# Patient Record
Sex: FEMALE | Race: BLACK OR AFRICAN AMERICAN | NOT HISPANIC OR LATINO | Employment: FULL TIME | URBAN - METROPOLITAN AREA
[De-identification: names, ages, dates, MRNs, and addresses within clinical notes are randomized per-mention and may not be internally consistent; named-entity substitution may affect disease eponyms.]

---

## 2017-04-12 ENCOUNTER — HISTORICAL (OUTPATIENT)
Dept: ADMINISTRATIVE | Facility: HOSPITAL | Age: 57
End: 2017-04-12

## 2017-09-15 LAB — CRC RECOMMENDATION EXT: NORMAL

## 2017-11-16 ENCOUNTER — HISTORICAL (OUTPATIENT)
Dept: ADMINISTRATIVE | Facility: HOSPITAL | Age: 57
End: 2017-11-16

## 2017-11-16 LAB
ABS NEUT (OLG): 1.05 X10(3)/MCL (ref 2.1–9.2)
ALBUMIN SERPL-MCNC: 3.9 GM/DL (ref 3.4–5)
ALBUMIN/GLOB SERPL: 1.1 RATIO (ref 1.1–2)
ALP SERPL-CCNC: 82 UNIT/L (ref 38–126)
ALT SERPL-CCNC: 35 UNIT/L (ref 12–78)
APPEARANCE, UA: CLEAR
AST SERPL-CCNC: 20 UNIT/L (ref 15–37)
BACTERIA SPEC CULT: NORMAL /HPF
BASOPHILS NFR BLD MANUAL: 1 % (ref 0–2)
BILIRUB SERPL-MCNC: 0.5 MG/DL (ref 0.2–1)
BILIRUB UR QL STRIP: NEGATIVE
BILIRUBIN DIRECT+TOT PNL SERPL-MCNC: 0.1 MG/DL (ref 0–0.5)
BILIRUBIN DIRECT+TOT PNL SERPL-MCNC: 0.4 MG/DL (ref 0–0.8)
BUN SERPL-MCNC: 13 MG/DL (ref 7–18)
CALCIUM SERPL-MCNC: 9.8 MG/DL (ref 8.5–10.1)
CHLORIDE SERPL-SCNC: 104 MMOL/L (ref 98–107)
CHOLEST SERPL-MCNC: 163 MG/DL (ref 0–200)
CHOLEST/HDLC SERPL: 2.6 {RATIO} (ref 0–4)
CO2 SERPL-SCNC: 29 MMOL/L (ref 21–32)
COLOR UR: YELLOW
CREAT SERPL-MCNC: 0.83 MG/DL (ref 0.55–1.02)
EOSINOPHIL NFR BLD MANUAL: 4 % (ref 0–8)
ERYTHROCYTE [DISTWIDTH] IN BLOOD BY AUTOMATED COUNT: 12.4 % (ref 11.5–17)
GLOBULIN SER-MCNC: 3.7 GM/DL (ref 2.4–3.5)
GLUCOSE (UA): NEGATIVE
GLUCOSE SERPL-MCNC: 98 MG/DL (ref 74–106)
HCT VFR BLD AUTO: 38.4 % (ref 37–47)
HDLC SERPL-MCNC: 63 MG/DL (ref 35–60)
HGB BLD-MCNC: 13 GM/DL (ref 12–16)
HGB UR QL STRIP: NEGATIVE
KETONES UR QL STRIP: NEGATIVE
LDLC SERPL CALC-MCNC: 85 MG/DL (ref 0–129)
LEUKOCYTE ESTERASE UR QL STRIP: NEGATIVE
LYMPHOCYTES NFR BLD MANUAL: 48 % (ref 13–40)
LYMPHOCYTES NFR BLD MANUAL: 7 %
MCH RBC QN AUTO: 30 PG (ref 27–31)
MCHC RBC AUTO-ENTMCNC: 33.9 GM/DL (ref 33–36)
MCV RBC AUTO: 88.7 FL (ref 80–94)
MONOCYTES NFR BLD MANUAL: 2 % (ref 2–11)
NEUTROPHILS NFR BLD MANUAL: 37 % (ref 47–80)
NITRITE UR QL STRIP: NEGATIVE
PH UR STRIP: 5.5 [PH] (ref 5–9)
PLATELET # BLD AUTO: 222 X10(3)/MCL (ref 130–400)
PLATELET # BLD EST: NORMAL 10*3/UL
PMV BLD AUTO: 10 FL (ref 7.4–10.4)
POIKILOCYTOSIS BLD QL SMEAR: 1
POTASSIUM SERPL-SCNC: 4.2 MMOL/L (ref 3.5–5.1)
PROT SERPL-MCNC: 7.6 GM/DL (ref 6.4–8.2)
PROT UR QL STRIP: NEGATIVE
RBC # BLD AUTO: 4.33 X10(6)/MCL (ref 4.2–5.4)
RBC #/AREA URNS HPF: NORMAL /[HPF]
SODIUM SERPL-SCNC: 143 MMOL/L (ref 136–145)
SP GR UR STRIP: 1.02 (ref 1–1.03)
SQUAMOUS EPITHELIAL, UA: NORMAL
TRIGL SERPL-MCNC: 76 MG/DL (ref 30–150)
TSH SERPL-ACNC: 3.24 MIU/ML (ref 0.36–3.74)
UROBILINOGEN UR STRIP-ACNC: 0.2
VLDLC SERPL CALC-MCNC: 15 MG/DL
WBC # SPEC AUTO: 3.4 X10(3)/MCL (ref 4.5–11.5)
WBC #/AREA URNS HPF: NORMAL /[HPF]

## 2018-02-23 LAB — PAP RECOMMENDATION EXT: NORMAL

## 2018-03-20 ENCOUNTER — HISTORICAL (OUTPATIENT)
Dept: RADIOLOGY | Facility: HOSPITAL | Age: 58
End: 2018-03-20

## 2018-04-05 ENCOUNTER — HISTORICAL (OUTPATIENT)
Dept: ADMINISTRATIVE | Facility: HOSPITAL | Age: 58
End: 2018-04-05

## 2018-04-05 LAB
ABS NEUT (OLG): 1.41 X10(3)/MCL (ref 2.1–9.2)
ALBUMIN SERPL-MCNC: 3.6 GM/DL (ref 3.4–5)
ALBUMIN/GLOB SERPL: 1 RATIO (ref 1.1–2)
ALP SERPL-CCNC: 83 UNIT/L (ref 38–126)
ALT SERPL-CCNC: 54 UNIT/L (ref 12–78)
ANISOCYTOSIS BLD QL SMEAR: 1
APPEARANCE, UA: ABNORMAL
APTT PPP: 28.6 SECOND(S) (ref 24.8–36.9)
AST SERPL-CCNC: 59 UNIT/L (ref 15–37)
BACTERIA SPEC CULT: ABNORMAL /HPF
BILIRUB SERPL-MCNC: 0.6 MG/DL (ref 0.2–1)
BILIRUB UR QL STRIP: NEGATIVE
BILIRUBIN DIRECT+TOT PNL SERPL-MCNC: 0.1 MG/DL (ref 0–0.5)
BILIRUBIN DIRECT+TOT PNL SERPL-MCNC: 0.5 MG/DL (ref 0–0.8)
BUN SERPL-MCNC: 19 MG/DL (ref 7–18)
CALCIUM SERPL-MCNC: 9.6 MG/DL (ref 8.5–10.1)
CHLORIDE SERPL-SCNC: 103 MMOL/L (ref 98–107)
CO2 SERPL-SCNC: 26 MMOL/L (ref 21–32)
COLOR UR: YELLOW
CREAT SERPL-MCNC: 0.77 MG/DL (ref 0.55–1.02)
EOSINOPHIL NFR BLD MANUAL: 2 % (ref 0–8)
ERYTHROCYTE [DISTWIDTH] IN BLOOD BY AUTOMATED COUNT: 12.5 % (ref 11.5–17)
GLOBULIN SER-MCNC: 3.6 GM/DL (ref 2.4–3.5)
GLUCOSE (UA): NEGATIVE
GLUCOSE SERPL-MCNC: 97 MG/DL (ref 74–106)
HCT VFR BLD AUTO: 38.1 % (ref 37–47)
HGB BLD-MCNC: 12.7 GM/DL (ref 12–16)
HGB UR QL STRIP: NEGATIVE
INR PPP: 1.04 (ref 0–1.27)
KETONES UR QL STRIP: NEGATIVE
LEUKOCYTE ESTERASE UR QL STRIP: ABNORMAL
LYMPHOCYTES NFR BLD MANUAL: 52 % (ref 13–40)
MCH RBC QN AUTO: 29.3 PG (ref 27–31)
MCHC RBC AUTO-ENTMCNC: 33.3 GM/DL (ref 33–36)
MCV RBC AUTO: 87.8 FL (ref 80–94)
MONOCYTES NFR BLD MANUAL: 11 % (ref 2–11)
NEUTROPHILS NFR BLD MANUAL: 35 % (ref 47–80)
NITRITE UR QL STRIP: NEGATIVE
OVALOCYTES BLD QL SMEAR: 1
PH UR STRIP: 6.5 [PH] (ref 5–9)
PLATELET # BLD AUTO: 216 X10(3)/MCL (ref 130–400)
PLATELET # BLD EST: NORMAL 10*3/UL
PMV BLD AUTO: 10.5 FL (ref 7.4–10.4)
POIKILOCYTOSIS BLD QL SMEAR: 1
POTASSIUM SERPL-SCNC: 4.4 MMOL/L (ref 3.5–5.1)
PROT SERPL-MCNC: 7.2 GM/DL (ref 6.4–8.2)
PROT UR QL STRIP: NEGATIVE
PROTHROMBIN TIME: 13.9 SECOND(S) (ref 12.2–14.7)
RBC # BLD AUTO: 4.34 X10(6)/MCL (ref 4.2–5.4)
RBC #/AREA URNS HPF: ABNORMAL /HPF
SODIUM SERPL-SCNC: 138 MMOL/L (ref 136–145)
SP GR UR STRIP: 1.03 (ref 1–1.03)
SQUAMOUS EPITHELIAL, UA: ABNORMAL
UA WBC MAN: ABNORMAL /HPF
UROBILINOGEN UR STRIP-ACNC: 1
WBC # SPEC AUTO: 3.8 X10(3)/MCL (ref 4.5–11.5)

## 2018-10-26 ENCOUNTER — HISTORICAL (OUTPATIENT)
Dept: ADMINISTRATIVE | Facility: HOSPITAL | Age: 58
End: 2018-10-26

## 2018-10-26 LAB
ABS NEUT (OLG): 0.83 X10(3)/MCL (ref 2.1–9.2)
ALBUMIN SERPL-MCNC: 3.9 GM/DL (ref 3.4–5)
ALBUMIN/GLOB SERPL: 1.3 RATIO (ref 1.1–2)
ALP SERPL-CCNC: 85 UNIT/L (ref 38–126)
ALT SERPL-CCNC: 27 UNIT/L (ref 12–78)
ANISOCYTOSIS BLD QL SMEAR: 1
APPEARANCE, UA: CLEAR
AST SERPL-CCNC: 12 UNIT/L (ref 15–37)
BACTERIA SPEC CULT: NORMAL /HPF
BASOPHILS NFR BLD MANUAL: 1 % (ref 0–2)
BILIRUB SERPL-MCNC: 0.3 MG/DL (ref 0.2–1)
BILIRUB UR QL STRIP: NEGATIVE
BILIRUBIN DIRECT+TOT PNL SERPL-MCNC: 0.1 MG/DL (ref 0–0.5)
BILIRUBIN DIRECT+TOT PNL SERPL-MCNC: 0.2 MG/DL (ref 0–0.8)
BUN SERPL-MCNC: 18 MG/DL (ref 7–18)
CALCIUM SERPL-MCNC: 8.9 MG/DL (ref 8.5–10.1)
CHLORIDE SERPL-SCNC: 110 MMOL/L (ref 98–107)
CHOLEST SERPL-MCNC: 181 MG/DL (ref 0–200)
CHOLEST/HDLC SERPL: 3.2 {RATIO} (ref 0–4)
CO2 SERPL-SCNC: 29 MMOL/L (ref 21–32)
COLOR UR: YELLOW
CREAT SERPL-MCNC: 0.69 MG/DL (ref 0.55–1.02)
EOSINOPHIL NFR BLD MANUAL: 2 % (ref 0–8)
ERYTHROCYTE [DISTWIDTH] IN BLOOD BY AUTOMATED COUNT: 12.7 % (ref 11.5–17)
GLOBULIN SER-MCNC: 3.1 GM/DL (ref 2.4–3.5)
GLUCOSE (UA): NEGATIVE
GLUCOSE SERPL-MCNC: 89 MG/DL (ref 74–106)
HCT VFR BLD AUTO: 38.7 % (ref 37–47)
HDLC SERPL-MCNC: 57 MG/DL (ref 35–60)
HGB BLD-MCNC: 12.2 GM/DL (ref 12–16)
HGB UR QL STRIP: NEGATIVE
KETONES UR QL STRIP: NEGATIVE
LDLC SERPL CALC-MCNC: 109 MG/DL (ref 0–129)
LEUKOCYTE ESTERASE UR QL STRIP: NEGATIVE
LYMPHOCYTES NFR BLD MANUAL: 60 % (ref 13–40)
MCH RBC QN AUTO: 29.2 PG (ref 27–31)
MCHC RBC AUTO-ENTMCNC: 31.5 GM/DL (ref 33–36)
MCV RBC AUTO: 92.6 FL (ref 80–94)
MICROCYTES BLD QL SMEAR: 1
MONOCYTES NFR BLD MANUAL: 5 % (ref 2–11)
NEUTROPHILS NFR BLD MANUAL: 32 % (ref 47–80)
NITRITE UR QL STRIP: NEGATIVE
PH UR STRIP: 5.5 [PH] (ref 5–9)
PLATELET # BLD AUTO: 204 X10(3)/MCL (ref 130–400)
PLATELET # BLD EST: NORMAL 10*3/UL
PMV BLD AUTO: 11 FL (ref 7.4–10.4)
POTASSIUM SERPL-SCNC: 4.4 MMOL/L (ref 3.5–5.1)
PROT SERPL-MCNC: 7 GM/DL (ref 6.4–8.2)
PROT UR QL STRIP: NEGATIVE
RBC # BLD AUTO: 4.18 X10(6)/MCL (ref 4.2–5.4)
RBC #/AREA URNS HPF: NORMAL /[HPF]
SODIUM SERPL-SCNC: 144 MMOL/L (ref 136–145)
SP GR UR STRIP: 1.02 (ref 1–1.03)
SQUAMOUS EPITHELIAL, UA: NORMAL
TRIGL SERPL-MCNC: 75 MG/DL (ref 30–150)
TSH SERPL-ACNC: 0.81 MIU/L (ref 0.36–3.74)
UROBILINOGEN UR STRIP-ACNC: 0.2
VLDLC SERPL CALC-MCNC: 15 MG/DL
WBC # SPEC AUTO: 2.8 X10(3)/MCL (ref 4.5–11.5)
WBC #/AREA URNS HPF: NORMAL /[HPF]

## 2019-05-13 ENCOUNTER — HISTORICAL (OUTPATIENT)
Dept: ADMINISTRATIVE | Facility: HOSPITAL | Age: 59
End: 2019-05-13

## 2019-05-13 LAB
ABS NEUT (OLG): 0.9 X10(3)/MCL (ref 2.1–9.2)
ALBUMIN SERPL-MCNC: 3.7 GM/DL (ref 3.4–5)
ALBUMIN/GLOB SERPL: 1.2 RATIO (ref 1.1–2)
ALP SERPL-CCNC: 77 UNIT/L (ref 38–126)
ALT SERPL-CCNC: 27 UNIT/L (ref 12–78)
APPEARANCE, UA: CLEAR
AST SERPL-CCNC: 14 UNIT/L (ref 15–37)
BACTERIA SPEC CULT: NORMAL /HPF
BASOPHILS # BLD AUTO: 0 X10(3)/MCL (ref 0–0.2)
BASOPHILS NFR BLD AUTO: 0 %
BILIRUB SERPL-MCNC: 0.3 MG/DL (ref 0.2–1)
BILIRUB UR QL STRIP: NEGATIVE
BILIRUBIN DIRECT+TOT PNL SERPL-MCNC: 0.1 MG/DL (ref 0–0.5)
BILIRUBIN DIRECT+TOT PNL SERPL-MCNC: 0.2 MG/DL (ref 0–0.8)
BUN SERPL-MCNC: 11 MG/DL (ref 7–18)
CALCIUM SERPL-MCNC: 9.3 MG/DL (ref 8.5–10.1)
CHLORIDE SERPL-SCNC: 107 MMOL/L (ref 98–107)
CHOLEST SERPL-MCNC: 192 MG/DL (ref 0–200)
CHOLEST/HDLC SERPL: 3.2 {RATIO} (ref 0–4)
CO2 SERPL-SCNC: 32 MMOL/L (ref 21–32)
COLOR UR: YELLOW
CREAT SERPL-MCNC: 0.68 MG/DL (ref 0.55–1.02)
CREAT UR-MCNC: 29 MG/DL
DEPRECATED CALCIDIOL+CALCIFEROL SERPL-MC: 9.7 NG/ML (ref 30–80)
EOSINOPHIL # BLD AUTO: 0.1 X10(3)/MCL (ref 0–0.9)
EOSINOPHIL NFR BLD AUTO: 3 %
ERYTHROCYTE [DISTWIDTH] IN BLOOD BY AUTOMATED COUNT: 12.5 % (ref 11.5–17)
EST. AVERAGE GLUCOSE BLD GHB EST-MCNC: 114 MG/DL
GLOBULIN SER-MCNC: 3.2 GM/DL (ref 2.4–3.5)
GLUCOSE (UA): NEGATIVE
GLUCOSE SERPL-MCNC: 79 MG/DL (ref 74–106)
HBA1C MFR BLD: 5.6 % (ref 4.2–6.3)
HCT VFR BLD AUTO: 36.2 % (ref 37–47)
HDLC SERPL-MCNC: 60 MG/DL (ref 35–60)
HGB BLD-MCNC: 11.6 GM/DL (ref 12–16)
HGB UR QL STRIP: NEGATIVE
KETONES UR QL STRIP: NEGATIVE
LDLC SERPL CALC-MCNC: 114 MG/DL (ref 0–129)
LEUKOCYTE ESTERASE UR QL STRIP: NEGATIVE
LYMPHOCYTES # BLD AUTO: 2 X10(3)/MCL (ref 0.6–4.6)
LYMPHOCYTES NFR BLD AUTO: 62 %
MCH RBC QN AUTO: 28.9 PG (ref 27–31)
MCHC RBC AUTO-ENTMCNC: 32 GM/DL (ref 33–36)
MCV RBC AUTO: 90.3 FL (ref 80–94)
MICROALBUMIN UR-MCNC: <0.5 MG/DL
MICROALBUMIN/CREAT RATIO PNL UR: <17.2 MG/GM CR (ref 0–30)
MONOCYTES # BLD AUTO: 0.2 X10(3)/MCL (ref 0.1–1.3)
MONOCYTES NFR BLD AUTO: 6 %
NEUTROPHILS # BLD AUTO: 0.9 X10(3)/MCL (ref 2.1–9.2)
NEUTROPHILS NFR BLD AUTO: 29 %
NITRITE UR QL STRIP: NEGATIVE
PH UR STRIP: 7 [PH] (ref 5–9)
PLATELET # BLD AUTO: 217 X10(3)/MCL (ref 130–400)
PMV BLD AUTO: 10.5 FL (ref 9.4–12.4)
POTASSIUM SERPL-SCNC: 3.9 MMOL/L (ref 3.5–5.1)
PROT SERPL-MCNC: 6.9 GM/DL (ref 6.4–8.2)
PROT UR QL STRIP: NEGATIVE
RBC # BLD AUTO: 4.01 X10(6)/MCL (ref 4.2–5.4)
RBC #/AREA URNS HPF: NORMAL /[HPF]
SODIUM SERPL-SCNC: 141 MMOL/L (ref 136–145)
SP GR UR STRIP: 1.01 (ref 1–1.03)
SQUAMOUS EPITHELIAL, UA: NORMAL
TRIGL SERPL-MCNC: 91 MG/DL (ref 30–150)
TSH SERPL-ACNC: 0.4 MIU/L (ref 0.36–3.74)
UROBILINOGEN UR STRIP-ACNC: 0.2
VLDLC SERPL CALC-MCNC: 18 MG/DL
WBC # SPEC AUTO: 3.1 X10(3)/MCL (ref 4.5–11.5)
WBC #/AREA URNS HPF: NORMAL /[HPF]

## 2019-12-04 ENCOUNTER — HISTORICAL (OUTPATIENT)
Dept: ADMINISTRATIVE | Facility: HOSPITAL | Age: 59
End: 2019-12-04

## 2019-12-04 LAB
ABS NEUT (OLG): 1.07 X10(3)/MCL (ref 2.1–9.2)
ALBUMIN SERPL-MCNC: 3.9 GM/DL (ref 3.4–5)
ALBUMIN/GLOB SERPL: 1.3 {RATIO}
ALP SERPL-CCNC: 77 UNIT/L (ref 38–126)
ALT SERPL-CCNC: 41 UNIT/L (ref 12–78)
APPEARANCE, UA: CLEAR
AST SERPL-CCNC: 21 UNIT/L (ref 15–37)
BACTERIA SPEC CULT: NORMAL /HPF
BASOPHILS # BLD AUTO: 0 X10(3)/MCL (ref 0–0.2)
BASOPHILS NFR BLD AUTO: 0 %
BILIRUB SERPL-MCNC: 0.7 MG/DL (ref 0.2–1)
BILIRUB UR QL STRIP: NEGATIVE
BILIRUBIN DIRECT+TOT PNL SERPL-MCNC: 0.1 MG/DL (ref 0–0.2)
BILIRUBIN DIRECT+TOT PNL SERPL-MCNC: 0.6 MG/DL (ref 0–0.8)
BUN SERPL-MCNC: 12 MG/DL (ref 7–18)
CALCIUM SERPL-MCNC: 9.4 MG/DL (ref 8.5–10.1)
CHLORIDE SERPL-SCNC: 108 MMOL/L (ref 98–107)
CHOLEST SERPL-MCNC: 196 MG/DL (ref 0–200)
CHOLEST/HDLC SERPL: 3.6 {RATIO} (ref 0–4)
CO2 SERPL-SCNC: 28 MMOL/L (ref 21–32)
COLOR UR: YELLOW
CREAT SERPL-MCNC: 0.85 MG/DL (ref 0.55–1.02)
DEPRECATED CALCIDIOL+CALCIFEROL SERPL-MC: 15.74 NG/ML (ref 30–80)
EOSINOPHIL # BLD AUTO: 0.1 X10(3)/MCL (ref 0–0.9)
EOSINOPHIL NFR BLD AUTO: 3 %
ERYTHROCYTE [DISTWIDTH] IN BLOOD BY AUTOMATED COUNT: 12.7 % (ref 11.5–17)
GLOBULIN SER-MCNC: 3 GM/DL (ref 2.4–3.5)
GLUCOSE (UA): NEGATIVE
GLUCOSE SERPL-MCNC: 101 MG/DL (ref 74–106)
HCT VFR BLD AUTO: 37.6 % (ref 37–47)
HDLC SERPL-MCNC: 54 MG/DL (ref 35–60)
HGB BLD-MCNC: 12.5 GM/DL (ref 12–16)
HGB UR QL STRIP: NEGATIVE
KETONES UR QL STRIP: NEGATIVE
LDLC SERPL CALC-MCNC: 118 MG/DL (ref 0–129)
LEUKOCYTE ESTERASE UR QL STRIP: NEGATIVE
LYMPHOCYTES # BLD AUTO: 1.6 X10(3)/MCL (ref 0.6–4.6)
LYMPHOCYTES NFR BLD AUTO: 53 %
MCH RBC QN AUTO: 30 PG (ref 27–31)
MCHC RBC AUTO-ENTMCNC: 33.2 GM/DL (ref 33–36)
MCV RBC AUTO: 90.2 FL (ref 80–94)
MONOCYTES # BLD AUTO: 0.2 X10(3)/MCL (ref 0.1–1.3)
MONOCYTES NFR BLD AUTO: 7 %
NEUTROPHILS # BLD AUTO: 1.07 X10(3)/MCL (ref 2.1–9.2)
NEUTROPHILS NFR BLD AUTO: 36 %
NITRITE UR QL STRIP: NEGATIVE
PH UR STRIP: 5.5 [PH] (ref 5–9)
PLATELET # BLD AUTO: 209 X10(3)/MCL (ref 130–400)
PMV BLD AUTO: 10.8 FL (ref 9.4–12.4)
POTASSIUM SERPL-SCNC: 4 MMOL/L (ref 3.5–5.1)
PROT SERPL-MCNC: 6.9 GM/DL (ref 6.4–8.2)
PROT UR QL STRIP: NEGATIVE
RBC # BLD AUTO: 4.17 X10(6)/MCL (ref 4.2–5.4)
RBC #/AREA URNS HPF: NORMAL /[HPF]
SODIUM SERPL-SCNC: 140 MMOL/L (ref 136–145)
SP GR UR STRIP: 1.02 (ref 1–1.03)
SQUAMOUS EPITHELIAL, UA: NORMAL
TRIGL SERPL-MCNC: 120 MG/DL (ref 30–150)
TSH SERPL-ACNC: 1.47 MIU/L (ref 0.36–3.74)
UROBILINOGEN UR STRIP-ACNC: 0.2
VLDLC SERPL CALC-MCNC: 24 MG/DL
WBC # SPEC AUTO: 3 X10(3)/MCL (ref 4.5–11.5)
WBC #/AREA URNS HPF: NORMAL /[HPF]

## 2020-06-02 ENCOUNTER — HISTORICAL (OUTPATIENT)
Dept: ADMINISTRATIVE | Facility: HOSPITAL | Age: 60
End: 2020-06-02

## 2020-06-02 LAB
ABS NEUT (OLG): 0.87 X10(3)/MCL (ref 2.1–9.2)
ALBUMIN SERPL-MCNC: 4 GM/DL (ref 3.5–5)
ALBUMIN/GLOB SERPL: 1.5 RATIO (ref 1.1–2)
ALP SERPL-CCNC: 67 UNIT/L (ref 40–150)
ALT SERPL-CCNC: 21 UNIT/L (ref 0–55)
APPEARANCE, UA: CLEAR
AST SERPL-CCNC: 19 UNIT/L (ref 5–34)
BACTERIA SPEC CULT: ABNORMAL /HPF
BILIRUB SERPL-MCNC: 0.5 MG/DL
BILIRUB UR QL STRIP: NEGATIVE
BILIRUBIN DIRECT+TOT PNL SERPL-MCNC: 0.2 MG/DL (ref 0–0.5)
BILIRUBIN DIRECT+TOT PNL SERPL-MCNC: 0.3 MG/DL (ref 0–0.8)
BUN SERPL-MCNC: 13.1 MG/DL (ref 9.8–20.1)
CALCIUM SERPL-MCNC: 9.2 MG/DL (ref 8.4–10.2)
CHLORIDE SERPL-SCNC: 104 MMOL/L (ref 98–107)
CHOLEST SERPL-MCNC: 225 MG/DL
CHOLEST/HDLC SERPL: 5 {RATIO} (ref 0–5)
CO2 SERPL-SCNC: 30 MMOL/L (ref 22–29)
COLOR UR: YELLOW
CREAT SERPL-MCNC: 0.75 MG/DL (ref 0.55–1.02)
CREAT UR-MCNC: 165.3 MG/DL (ref 45–106)
DEPRECATED CALCIDIOL+CALCIFEROL SERPL-MC: 9 NG/ML (ref 6.6–49.9)
EOSINOPHIL NFR BLD MANUAL: 7 % (ref 0–8)
ERYTHROCYTE [DISTWIDTH] IN BLOOD BY AUTOMATED COUNT: 12.5 % (ref 11.5–17)
EST. AVERAGE GLUCOSE BLD GHB EST-MCNC: 116.9 MG/DL
GLOBULIN SER-MCNC: 2.7 GM/DL (ref 2.4–3.5)
GLUCOSE (UA): NEGATIVE
GLUCOSE SERPL-MCNC: 101 MG/DL (ref 74–100)
HBA1C MFR BLD: 5.7 %
HCT VFR BLD AUTO: 37.9 % (ref 37–47)
HDLC SERPL-MCNC: 49 MG/DL (ref 35–60)
HGB BLD-MCNC: 12.1 GM/DL (ref 12–16)
HGB UR QL STRIP: NEGATIVE
KETONES UR QL STRIP: NEGATIVE
LDLC SERPL CALC-MCNC: 159 MG/DL (ref 50–140)
LEUKOCYTE ESTERASE UR QL STRIP: NEGATIVE
LYMPHOCYTES NFR BLD MANUAL: 58 % (ref 13–40)
MCH RBC QN AUTO: 29.2 PG (ref 27–31)
MCHC RBC AUTO-ENTMCNC: 31.9 GM/DL (ref 33–36)
MCV RBC AUTO: 91.5 FL (ref 80–94)
MICROALBUMIN UR-MCNC: 11.4 UG/ML
MICROALBUMIN/CREAT RATIO PNL UR: 6.9 MG/GM CR (ref 0–30)
MONOCYTES NFR BLD MANUAL: 5 % (ref 2–11)
NEUTROPHILS NFR BLD MANUAL: 30 % (ref 47–80)
NITRITE UR QL STRIP: NEGATIVE
PH UR STRIP: 5.5 [PH] (ref 5–9)
PLATELET # BLD AUTO: 206 X10(3)/MCL (ref 130–400)
PLATELET # BLD EST: ADEQUATE 10*3/UL
PMV BLD AUTO: 10.9 FL (ref 7.4–10.4)
POTASSIUM SERPL-SCNC: 4.2 MMOL/L (ref 3.5–5.1)
PROT SERPL-MCNC: 6.7 GM/DL (ref 6.4–8.3)
PROT UR QL STRIP: NEGATIVE
RBC # BLD AUTO: 4.14 X10(6)/MCL (ref 4.2–5.4)
RBC #/AREA URNS HPF: ABNORMAL /[HPF]
RBC MORPH BLD: NORMAL
SODIUM SERPL-SCNC: 141 MMOL/L (ref 136–145)
SP GR UR STRIP: 1.02 (ref 1–1.03)
SQUAMOUS EPITHELIAL, UA: ABNORMAL
TRIGL SERPL-MCNC: 85 MG/DL (ref 37–140)
TSH SERPL-ACNC: 5.01 UIU/ML (ref 0.35–4.94)
URATE SERPL-MCNC: 4.2 MG/DL (ref 2.7–6)
UROBILINOGEN UR STRIP-ACNC: 0.2
VLDLC SERPL CALC-MCNC: 17 MG/DL
WBC # SPEC AUTO: 2.9 X10(3)/MCL (ref 4.5–11.5)
WBC #/AREA URNS HPF: 5 /HPF (ref 0–3)

## 2021-06-11 ENCOUNTER — HISTORICAL (OUTPATIENT)
Dept: ADMINISTRATIVE | Facility: HOSPITAL | Age: 61
End: 2021-06-11

## 2021-06-11 LAB
ABS NEUT (OLG): 1.04 X10(3)/MCL (ref 2.1–9.2)
ALBUMIN SERPL-MCNC: 3.8 GM/DL (ref 3.5–5)
ALBUMIN/GLOB SERPL: 1.3 RATIO (ref 1.1–2)
ALP SERPL-CCNC: 68 UNIT/L (ref 40–150)
ALT SERPL-CCNC: 26 UNIT/L (ref 0–55)
APPEARANCE, UA: CLEAR
AST SERPL-CCNC: 21 UNIT/L (ref 5–34)
BACTERIA SPEC CULT: NORMAL /HPF
BILIRUB SERPL-MCNC: 0.5 MG/DL
BILIRUB UR QL STRIP: NEGATIVE
BILIRUBIN DIRECT+TOT PNL SERPL-MCNC: 0.2 MG/DL (ref 0–0.5)
BILIRUBIN DIRECT+TOT PNL SERPL-MCNC: 0.3 MG/DL (ref 0–0.8)
BUN SERPL-MCNC: 14.9 MG/DL (ref 9.8–20.1)
CALCIUM SERPL-MCNC: 9.5 MG/DL (ref 8.4–10.2)
CHLORIDE SERPL-SCNC: 103 MMOL/L (ref 98–107)
CHOLEST SERPL-MCNC: 200 MG/DL
CHOLEST/HDLC SERPL: 4 {RATIO} (ref 0–5)
CO2 SERPL-SCNC: 32 MMOL/L (ref 22–29)
COLOR UR: YELLOW
CREAT SERPL-MCNC: 0.83 MG/DL (ref 0.55–1.02)
CREAT UR-MCNC: 63.9 MG/DL (ref 45–106)
DEPRECATED CALCIDIOL+CALCIFEROL SERPL-MC: 17.4 NG/ML (ref 30–80)
EOSINOPHIL NFR BLD MANUAL: 6 % (ref 0–8)
ERYTHROCYTE [DISTWIDTH] IN BLOOD BY AUTOMATED COUNT: 12.6 % (ref 11.5–17)
EST. AVERAGE GLUCOSE BLD GHB EST-MCNC: 114 MG/DL
GLOBULIN SER-MCNC: 2.9 GM/DL (ref 2.4–3.5)
GLUCOSE (UA): NEGATIVE
GLUCOSE SERPL-MCNC: 88 MG/DL (ref 74–100)
HBA1C MFR BLD: 5.6 %
HCT VFR BLD AUTO: 37.6 % (ref 37–47)
HDLC SERPL-MCNC: 50 MG/DL (ref 35–60)
HGB BLD-MCNC: 12.3 GM/DL (ref 12–16)
HGB UR QL STRIP: NEGATIVE
KETONES UR QL STRIP: NEGATIVE
LDLC SERPL CALC-MCNC: 132 MG/DL (ref 50–140)
LEUKOCYTE ESTERASE UR QL STRIP: NEGATIVE
LYMPHOCYTES NFR BLD MANUAL: 45 % (ref 13–40)
MCH RBC QN AUTO: 29.4 PG (ref 27–31)
MCHC RBC AUTO-ENTMCNC: 32.7 GM/DL (ref 33–36)
MCV RBC AUTO: 90 FL (ref 80–94)
MICROALBUMIN UR-MCNC: <5 UG/ML
MICROALBUMIN/CREAT RATIO PNL UR: <7.8 MG/GM CR (ref 0–30)
MONOCYTES NFR BLD MANUAL: 6 % (ref 2–11)
NEUTROPHILS NFR BLD MANUAL: 42 % (ref 47–80)
NITRITE UR QL STRIP: NEGATIVE
PH UR STRIP: 7 [PH] (ref 5–9)
PLATELET # BLD AUTO: 218 X10(3)/MCL (ref 130–400)
PLATELET # BLD EST: NORMAL 10*3/UL
PMV BLD AUTO: 11.1 FL (ref 7.4–10.4)
POIKILOCYTOSIS BLD QL SMEAR: 1
POTASSIUM SERPL-SCNC: 4.1 MMOL/L (ref 3.5–5.1)
PROT SERPL-MCNC: 6.7 GM/DL (ref 6.4–8.3)
PROT UR QL STRIP: NEGATIVE
RBC # BLD AUTO: 4.18 X10(6)/MCL (ref 4.2–5.4)
RBC #/AREA URNS HPF: NORMAL /[HPF]
RBC MORPH BLD: ABNORMAL
SODIUM SERPL-SCNC: 141 MMOL/L (ref 136–145)
SP GR UR STRIP: 1.01 (ref 1–1.03)
SQUAMOUS EPITHELIAL, UA: NORMAL /HPF (ref 0–4)
TRIGL SERPL-MCNC: 89 MG/DL (ref 37–140)
TSH SERPL-ACNC: 2.07 UIU/ML (ref 0.35–4.94)
URATE SERPL-MCNC: 5.4 MG/DL (ref 2.6–6)
UROBILINOGEN UR STRIP-ACNC: 0.2
VLDLC SERPL CALC-MCNC: 18 MG/DL
WBC # SPEC AUTO: 2.9 X10(3)/MCL (ref 4.5–11.5)
WBC #/AREA URNS HPF: NORMAL /[HPF]

## 2021-09-22 ENCOUNTER — HISTORICAL (OUTPATIENT)
Dept: ADMINISTRATIVE | Facility: HOSPITAL | Age: 61
End: 2021-09-22

## 2022-01-06 ENCOUNTER — HISTORICAL (OUTPATIENT)
Dept: ANESTHESIOLOGY | Facility: HOSPITAL | Age: 62
End: 2022-01-06

## 2022-04-10 ENCOUNTER — HISTORICAL (OUTPATIENT)
Dept: ADMINISTRATIVE | Facility: HOSPITAL | Age: 62
End: 2022-04-10
Payer: COMMERCIAL

## 2022-04-24 VITALS
DIASTOLIC BLOOD PRESSURE: 80 MMHG | SYSTOLIC BLOOD PRESSURE: 120 MMHG | BODY MASS INDEX: 33.33 KG/M2 | HEIGHT: 60 IN | OXYGEN SATURATION: 98 % | WEIGHT: 169.75 LBS

## 2022-04-30 NOTE — PROGRESS NOTES
Patient:   Wanda Heredia            MRN: [id number]            FIN: [id number]               Age:   57 years     Sex:  Female     :  1960   Associated Diagnoses:   None   Author:   Sergio NAVARRETE, Gilles MARTINES      Report Summary   Course:  Unchanged. Summary:  This patient has done very well since her last visit, and she had laboratory studies drawn prior to her office visit. I was able to review her laboratory studies with her in detail, and I do not find any acute abnormalities, and importantly, this patient has had an improvement in some of her metabolic studies. There is no evidence of any toxicity to medication that she takes.    This patient will continue her medication as previously given. I encourage her to remain active. Follow-up will be provided.. Plan:  I discussed blood pressure management strategies with the patient today. I also recommend a low salt and low pork diet. We discussed antihypertensive medication. I have stressed an increase in physical activity and exercise .    I held a discussion about cholesterol management with the patient today. The patient understands better than before and will try to change the medication regimen and diet.This is considered a major risk factor for Atherosclerosis.    Exercise is defined as 30 minutes of sustained activity performed at least 3 or 4 days each week.    Remember to take thyroid supplements daily on an empty stomach.. Patient Instructions:  Lipid Profile Test. Orders   Chief Complaint   2017 9:12 CST      6 month check up        History of Present Illness   This patient is a 57 year old established patient. Her active problem list and current medication will be reviewed at the time of this visit. Her medical illnesses have been well recognized and documented in her chart. She had laboratory studies drawn prior to her office visit and she is calm in for a face-to-face encounter to review the results. This is a 6 month follow-up  examination for this patient.      Review of Systems   Constitutional:  Negative.    Eye:  Negative.    Ear/Nose/Mouth/Throat:  Negative.    Respiratory:  Negative.    Cardiovascular:  Negative.    Gastrointestinal:  Negative.    Genitourinary:  Negative.    Gynecologic:  Negative.    Hematology/Lymphatics:  Negative.    Endocrine:  Negative.    Immunologic:  Negative.    Musculoskeletal:  Negative.    Integumentary:  Negative.    Neurologic:  Negative.    Psychiatric:  Negative.    All other systems are negative      Health Status   Allergies:    Allergic Reactions (Selected)  Severity Not Documented  Sulfa drugs- No reactions were documented.,    Allergies (1) Active Reaction  sulfa drugs None Documented     Current medications:  (Selected)   Prescriptions  Prescribed  Nexium 40 mg oral delayed release cap ([facility]): See Instructions, TAKE ONE TABLET BY MOUTH TWICE DAILY, # 60 tab(s), 11 Refill(s), eRx: [id number]  levothyroxine 88 mcg (0.088 mg) oral tablet: 88 mcg = 1 tab(s), Oral, Daily, # 90 tab(s), 4 Refill(s), Pharmacy: [id number]  lisinopril 10 mg oral tablet: 10 mg = 1 tab(s), Oral, Daily, # 90 tab(s), 4 Refill(s), [facility]acy: [id number]  lovastatin 40 mg oral tablet: See Instructions, TAKE ONE TABLET BY MOUTH ONCE DAILY., # 90 tab(s), 4 Refill(s), Pharmacy: [facility] [id number],    Home Medications (4) Active  levothyroxine 88 mcg (0.088 mg) oral tablet 88 mcg = 1 tab(s), Oral, Daily  lisinopril 10 mg oral tablet 10 mg = 1 tab(s), Oral, Daily  lovastatin 40 mg oral tablet See Instructions  Nexium 40 mg oral delayed release cap ([facility]stitution) See Instructions     Problem list:    All Problems  DDD (Degenerative Disc Disease)(  Confirmed  ) / SNOMED CT 284476906 / Confirmed  Essential (primary) hypertension / SNOMED CT 58561300 / Confirmed  Fibrocystic breast disease(  Confirmed  ) / SNOMED CT 63100499 / Confirmed  Hyperlipidemia / SNOMED CT  27877730 / Confirmed  IBS (Irritable Bowel Syndrome)(  Confirmed  ) / SNOMED CT 68804626 / Confirmed  Multinodular Goiter(  Confirmed  ) / SNOMED CT 71712080 / Confirmed,    Active Problems (6)  DDD (Degenerative Disc Disease)(  Confirmed  )   Essential (primary) hypertension   Fibrocystic breast disease(  Confirmed  )   Hyperlipidemia   IBS (Irritable Bowel Syndrome)(  Confirmed  )   Multinodular Goiter(  Confirmed  )         Histories   Past Medical History:    No active or resolved past medical history items have been selected or recorded.   Family History:    Hypertension.  Father  Mother     Procedure history:    Hysterectomy (054899098).   delivery (7773707907).  sinus surgery.  Cholecystectomy (56946479).   Social History        Social & Psychosocial Habits    Alcohol  2015  Use: Never    Employment/School  2016  Status: Employed    Exercise  2015 Risk Assessment: Does not exercise    2016  Self assessment: Fair condition    Home/Environment  2015 Risk Assessment: No Risk    2016  Lives with: Spouse    Nutrition/Health  2015 Risk Assessment: No Risk    2016  Type of diet: Regular    Sexual  2016  Sexually active: Yes    Substance Abuse  2015  Use: Never    Tobacco  2015  Use: Never smoker  .        Physical Examination   Vital Signs   2017 9:12 CST      Temperature Temporal Artery               36.7 DegC                             Apical Heart Rate         72 bpm                             SpO2                      98 %                             Systolic Blood Pressure   120 mmHg                             Diastolic Blood Pressure  80 mmHg     General:  Alert and oriented, No acute distress.    Eye:  Pupils are equal, round and reactive to light, Extraocular movements are intact, Normal conjunctiva.    HENT:  Normocephalic, Tympanic membranes are clear, Normal hearing, Oral mucosa is moist, No pharyngeal erythema.     Neck:  Supple, No carotid bruit, No jugular venous distention, No lymphadenopathy, No thyromegaly.    Respiratory:  Lungs are clear to auscultation, Breath sounds are equal, Symmetrical chest wall expansion, No chest wall tenderness.    Cardiovascular:  Normal rate, Regular rhythm, No murmur, No gallop, Good pulses equal in all extremities, No edema.    Gastrointestinal:  Soft, Non-tender, Non-distended, Normal bowel sounds, No organomegaly.    Genitourinary:  No costovertebral angle tenderness, No inguinal tenderness, No urethral discharge, No lesions.    Lymphatics:  No lymphadenopathy neck, axilla, groin.    Musculoskeletal:  Normal range of motion, Normal strength, No tenderness, No swelling, Normal gait.    Integumentary:  Warm, Intact, No rash.    Neurologic:  Alert, Oriented, Normal sensory, Normal motor function, No focal deficits, Cranial Nerves II-XII are grossly intact, Normal deep tendon reflexes.    Psychiatric:  Cooperative, Appropriate mood & affect, Normal judgment, Non-suicidal.       Health Maintenance      Health Maintenance     Pending (in the next year)        OverDue           Smoking Cessation due  04/08/17  and every 1  year(s)           Hypertension Management-Education due  08/19/17  and every 1  year(s)           Hypertension Management-Blood Pressure due  10/13/17  and every 6  month(s)        Due            Influenza Vaccine due  10/01/17  and every 1  year(s)           Aspirin Therapy for CVD Prevention due  11/17/17  and every 1  year(s)        Due In Future            Breast Cancer Screening not due until  11/30/17  and every 1  year(s)           Blood Pressure Screening not due until  04/13/18  and every 1  year(s)           Body Mass Index Check not due until  04/13/18  and every 1  year(s)           Depression Screening not due until  04/13/18  and every 1  year(s)           Obesity Screening not due until  04/13/18  and every 1  year(s)           Hypertension Management-BMP not  due until  11/16/18  and every 1  year(s)     Satisfied (in the past 1 year)        Satisfied            Blood Pressure Screening on  04/13/17.  Satisfied by Kaitlyn Story LPN.           Body Mass Index Check on  04/13/17.  Satisfied by Kaitlyn Story LPN.           Breast Cancer Screening on  04/13/17.  Satisfied by Gilles Hill MD           Colorectal Screening on  09/15/17.  Satisfied by Kaitlyn Story LPN.           Depression Screening on  04/13/17.  Satisfied by Kaitlyn Story LPN.           Diabetes Screening on  11/16/17.  Satisfied by Justin Schrader           Hypertension Management-BMP on  11/16/17.  Satisfied by Justin Schrader           Lipid Screening on  11/16/17.  Satisfied by Felisha Julien           Obesity Screening on  04/13/17.  Satisfied by Kaitlyn Story LPN.        Postponed            Colorectal Screening until  04/13/17.  Recorded for Kaitlyn Story LPN  Reason: Parent Or Guardian Refuses           Influenza Vaccine until  04/13/17.  Recorded for Kaitlyn Story LPN          Impression and Plan   Course:  Unchanged.    Summary:  This patient has done very well since her last visit, and she had laboratory studies drawn prior to her office visit. I was able to review her laboratory studies with her in detail, and I do not find any acute abnormalities, and importantly, this patient has had an improvement in some of her metabolic studies. There is no evidence of any toxicity to medication that she takes.    This patient will continue her medication as previously given. I encourage her to remain active. Follow-up will be provided..    Plan:  I discussed blood pressure management strategies with the patient today. I also recommend a low salt and low pork diet. We discussed antihypertensive medication. I have stressed an increase in physical activity and exercise .    I held a discussion about cholesterol management with the patient today. The patient understands better than before  and will try to change the medication regimen and diet.This is considered a major risk factor for Atherosclerosis.    Exercise is defined as 30 minutes of sustained activity performed at least 3 or 4 days each week.    Remember to take thyroid supplements daily on an empty stomach..    Patient Instructions:  Lipid Profile Test.         Counseled: Patient, Regarding diagnosis, Regarding treatment, Regarding medications, Activity, Verbalized understanding.    Orders   Dx/Order Association Plan   Other Diagnosis   Other Orders      Review / Management   Results review:  Lab results   11/16/2017 7:21 CST      Sodium Lvl                143 mmol/L                             Potassium Lvl             4.2 mmol/L                             Chloride                  104 mmol/L                             CO2                       29.0 mmol/L                             Calcium Lvl               9.8 mg/dL                             Glucose Lvl               98 mg/dL                             BUN                       13.0 mg/dL                             Creatinine                0.83 mg/dL                             eGFR-AA                   >60 mL/min/1.73 m2  NA                             eGFR-WAGNER                  >60 mL/min/1.73 m2  NA                             Bili Total                0.5 mg/dL                             Bili Direct               0.10 mg/dL                             Bili Indirect             0.40 mg/dL                             AST                       20 unit/L                             ALT                       35 unit/L                             Alk Phos                  82 unit/L                             Total Protein             7.6 gm/dL                             Albumin Lvl               3.90 gm/dL                             Globulin                  3.70 gm/dL  HI                             A/G Ratio                 1.1 ratio                             Chol                       163 mg/dL                             HDL                       63 mg/dL  HI                             Trig                      76 mg/dL                             LDL                       85 mg/dL                             Chol/HDL                  2.6                             VLDL                      15 mg/dL  NA                             TSH                       3.240 mIU/mL                             WBC                       3.4 x10(3)/mcL  LOW                             RBC                       4.33 x10(6)/mcL                             Hgb                       13.0 gm/dL                             Hct                       38.4 %                             Platelet                  222 x10(3)/mcL                             MCV                       88.7 fL                             MCH                       30.0 pg                             MCHC                      33.9 gm/dL                             RDW                       12.4 %                             MPV                       10.0 fL                             Abs Neut                  1.05 x10(3)/mcL  LOW                             Neut Man                  37 %  LOW                             Lymph Man                 48 %  HI                             Monocyte Man              2 %                             Eos Man                   4 %                             Basophil Man              1 %                             Abn Lymph Man             7 %  HI                             Platelet Est              Normal                             Poik                      1  NA    11/16/2017 7:16 CST      UA Appear                 CLEAR                             UA Color                  YELLOW                             UA Spec Grav              1.016                             UA Bili                   Negative                             UA pH                     5.5                             UA  Urobilinogen           0.2                             UA Blood                  Negative                             UA Glucose                Negative                             UA Ketones                Negative                             UA Protein                Negative                             UA Nitrite                Negative                             UA Leuk Est               Negative                             UA WBC                    NONE SEEN                             UA RBC                    NONE SEEN                             UA Bacteria               NONE SEEN /HPF                             UA Squam Epithelial       NONE SEEN  .

## 2022-04-30 NOTE — PROGRESS NOTES
Patient:   Wanda Heredia            MRN: [id number]            FIN: [id number]               Age:   59 years     Sex:  Female     :  1960   Associated Diagnoses:   None   Author:   Gilles Hill MD      Report Summary   Course:  This patient has not had any ER visits or hospitalizations since last office visit. SummaryPlan:  This patient should continue to take all medication chronically given for known medical illnesses.    Exercise is defined as 30 minutes of sustained activity performed at least 3 or 4 days each week.. Patient Instructions:  Dyslipidemia. Orders   Chief Complaint   2019 8:10 CDT       well adult exam        History of Present Illness   This patient is an established patient. The active problem list and current medication listed in the chart will be reviewed at the time of this visit. This patient's medical illnesses have been well recognized and documented in the chart. A review of systems will be taken at the time of this visit, and any new information necessary for care will be documented. This patient had laboratory studies drawn prior to her office visit. She has come in for a face-to-face encounter to review the results. This patient has come in for a wellness examination.      Review of Systems   Constitutional:  Negative.    Eye:  Negative.    Ear/Nose/Mouth/Throat:  Negative.    Respiratory:  Negative.    Cardiovascular:  Negative.    Gastrointestinal:  Negative.    Genitourinary:  Negative.    Gynecologic:  Negative.    Hematology/Lymphatics:  Negative.    Endocrine:  Negative.    Immunologic:  Negative.    Musculoskeletal:  Negative.    Integumentary:  This patient complains of a lesion on the back of her neck, which she wanted me to look at..    Neurologic:  Negative.    Psychiatric:  Negative.    All other systems are negative      Health Status   Allergies:    Allergic Reactions (Selected)  Severity Not Documented  Sulfa drugs- No reactions were  documented.,    Allergies (1) Active Reaction  sulfa drugs None Documented   Current medications:  (Selected)   Prescriptions  Prescribed  Cheratussin AC 10 mg-100 mg/5 mL oral syrup: 1-2 tsp, Oral, q4hr, # 120 mL, 1 Refill(s), called to pharmacy (Rx)  Nexium 40 mg oral delayed release cap ([facility] Substitution): See Instructions, TAKE 1 TABLET BY MOUTH TWICE DAILY, # 60 tab(s), 11 Refill(s), eRx: [facility] [id number]  chlordiazepoxide-clidinium 5 mg-2.5 mg oral capsule: See Instructions, TAKE 1 CAPSULE BY MOUTH THREE TIMES DAILY BEFORE A MEAL, # 260 cap(s), 4 Refill(s), eRx: [facility] [id number]  levothyroxine 88 mcg (0.088 mg) oral tablet: 88 mcg = 1 tab(s), Oral, Daily, # 90 tab(s), 4 Refill(s), Pharmacy: [id number]  lisinopril 10 mg oral tablet: See Instructions, TAKE 1 TABLET BY MOUTH DAILY, # 90 tab(s), 4 Refill(s), Pharmacy: [facility] [id number]  lovastatin 40 mg oral tablet: See Instructions, TAKE 1 TABLET BY MOUTH EVERY DAY, # 90 tab(s), 4 Refill(s), eRx: [facility] [id number],    Home Medications (6) Active  Cheratussin AC 10 mg-100 mg/5 mL oral syrup 1-2 tsp, Oral, q4hr  chlordiazepoxide-clidinium 5 mg-2.5 mg oral capsule See Instructions  levothyroxine 88 mcg (0.088 mg) oral tablet 88 mcg = 1 tab(s), Oral, Daily  lisinopril 10 mg oral tablet See Instructions  lovastatin 40 mg oral tablet See Instructions  Nexium 40 mg oral delayed release cap ([facility] Substitution) See Instructions   Problem list:    All Problems  DDD (Degenerative Disc Disease)(  Confirmed  ) / SNOMED CT 494582863 / Confirmed  Essential (primary) hypertension / SNOMED CT 01695268 / Confirmed  Fibrocystic breast disease(  Confirmed  ) / SNOMED CT 82511802 / Confirmed  Hyperlipidemia / SNOMED CT 29125410 / Confirmed  IBS (Irritable Bowel Syndrome)(  Confirmed  ) / SNOMED CT 10286668 / Confirmed  Multinodular Goiter(  Confirmed  ) / SNOMED CT 67024420 / Confirmed,    Active Problems (6)  DDD  (Degenerative Disc Disease)(  Confirmed  )   Essential (primary) hypertension   Fibrocystic breast disease(  Confirmed  )   Hyperlipidemia   IBS (Irritable Bowel Syndrome)(  Confirmed  )   Multinodular Goiter(  Confirmed  )       Histories   Past Medical History:    No active or resolved past medical history items have been selected or recorded.   Family History:    Hypertension.  Mother  Father     Procedure history:    Hysterectomy (687988152).   delivery (5426489403).  sinus surgery.  Cholecystectomy (05328701).   Social History        Social & Psychosocial Habits    Alcohol  2015  Use: Never    Employment/School  2016  Status: Employed    Exercise  2015 Risk Assessment: Does not exercise    2016  Self assessment: Fair condition    Home/Environment  2015 Risk Assessment: No Risk    2016  Lives with: Spouse    Nutrition/Health  2015 Risk Assessment: No Risk    2016  Type of diet: Regular    Sexual  2016  Sexually active: Yes    Substance Abuse  2015  Use: Never    Tobacco  10/30/2018  Use: Never smoker    Patient Wants Consult For Cessation Counseling N/A.        Physical Examination   Vital Signs   2019 8:10 CDT       Temperature Temporal Artery               37.0 DegC                             Apical Heart Rate         68 bpm                             SpO2                      99 %                             Systolic Blood Pressure   120 mmHg                             Diastolic Blood Pressure  80 mmHg                             Blood Pressure Location   Right arm                             Manual Cuff BP            Yes     General:  Alert and oriented, No acute distress.    Eye:  Pupils are equal, round and reactive to light, Extraocular movements are intact, Normal conjunctiva.    HENT:  Normocephalic, Tympanic membranes are clear, Normal hearing, Oral mucosa is moist, No pharyngeal erythema.    Neck:  Supple, No carotid  bruit, No jugular venous distention, No lymphadenopathy, No thyromegaly.    Respiratory:  Lungs are clear to auscultation, Breath sounds are equal, Symmetrical chest wall expansion, No chest wall tenderness.    Cardiovascular:  Normal rate, Regular rhythm, No murmur, No gallop, Good pulses equal in all extremities, No edema.    Gastrointestinal:  Soft, Non-tender, Non-distended, Normal bowel sounds, No organomegaly.    Genitourinary:  No costovertebral angle tenderness, No inguinal tenderness, No urethral discharge, No lesions.    Lymphatics:  No lymphadenopathy neck, axilla, groin.    Musculoskeletal:  Normal range of motion, Normal strength, No tenderness, No swelling, Normal gait.    Integumentary:  Warm, Intact, No rash, Lesion on the back of the neck is a small sebaceous cyst..    Neurologic:  Alert, Oriented, Normal sensory, Normal motor function, No focal deficits, Cranial Nerves II-XII are grossly intact, Normal deep tendon reflexes.    Psychiatric:  Cooperative, Appropriate mood & affect, Normal judgment, Non-suicidal.       Health Maintenance      Health Maintenance     Pending (in the next year)        OverDue           Diabetes Screening due  and every            Hypertension Management-Education due  08/19/17  and every 1  year(s)           Alcohol Misuse Screening due  01/01/19  and every 1  year(s)           Obesity Screening due  01/01/19  and every 1  year(s)           Aspirin Therapy for CVD Prevention due  02/16/19  and every 1  year(s)        Due            ADL Screening due  05/14/19  and every 1  year(s)        Refused            Tetanus Vaccine due  05/14/19  and every 10  year(s)        Due In Future            Blood Pressure Screening not due until  10/30/19  and every 1  year(s)           Body Mass Index Check not due until  10/30/19  and every 1  year(s)           Depression Screening not due until  10/30/19  and every 1  year(s)           Hypertension Management-Blood Pressure not due  until  10/30/19  and every 1  year(s)           Breast Cancer Screening not due until  12/01/19  and every 2  year(s)           Hypertension Management-BMP not due until  05/12/20  and every 1  year(s)     Satisfied (in the past 1 year)        Satisfied            Blood Pressure Screening on  10/30/18.  Satisfied by Kaitlyn Story LPN           Body Mass Index Check on  10/30/18.  Satisfied by Kaitlyn Story LPN           Depression Screening on  10/30/18.  Satisfied by Kaitlyn Story LPN           Diabetes Screening on  05/13/19.  Satisfied by Myla Medina           Hypertension Management-BMP on  05/13/19.  Satisfied by Sima Loaiza           Influenza Vaccine on  10/30/18.  Satisfied by Kaitlyn Story LPN           Lipid Screening on  05/13/19.  Satisfied by Myla Medina           Obesity Screening on  10/30/18.  Satisfied by Kaitlyn Story LPN        Impression and Plan   Course:  This patient has not had any ER visits or hospitalizations since last office visit.    Summary:  This patient is a 59-year-old established patient who has medical illnesses that have been well established in this office. She has come in for a wellness examination. She had laboratory studies drawn prior to her office visit, and I was able to review the results with her in detail. I have given her a hand written explanation of her results for her records.    On physical examination, I do not find any new abnormalities make note of..    Plan:  This patient should continue to take all medication chronically given for known medical illnesses.    Exercise is defined as 30 minutes of sustained activity performed at least 3 or 4 days each week..    Patient Instructions:  Dyslipidemia.         Counseled: Patient, Regarding diagnosis, Regarding treatment, Regarding medications, Activity, Verbalized understanding.    Orders   Dx/Order Association Plan   Other Diagnosis   Other Orders      Review / Management   Results  review:  Lab results   5/13/2019 13:00 CDT      U Creatinine              29.0 mg/dL  NA                             U Microalb                <0.5 mg/dL  NA                             Microalb/Creat            <17.2 mg/gm Cr                             UA Appear                 CLEAR                             UA Color                  YELLOW                             UA Spec Grav              1.009                             UA Bili                   Negative                             UA pH                     7.0                             UA Urobilinogen           0.2                             UA Blood                  Negative                             UA Glucose                Negative                             UA Ketones                Negative                             UA Protein                Negative                             UA Nitrite                Negative                             UA Leuk Est               Negative                             UA WBC                    NONE SEEN                             UA RBC                    NONE SEEN                             UA Bacteria               NONE SEEN /HPF                             UA Squam Epithelial       NONE SEEN    5/13/2019 12:57 CDT      Sodium Lvl                141 mmol/L                             Potassium Lvl             3.9 mmol/L                             Chloride                  107 mmol/L                             CO2                       32.0 mmol/L                             Calcium Lvl               9.3 mg/dL                             Glucose Lvl               79 mg/dL                             EAG                       114 mg/dL  NA                             BUN                       11.0 mg/dL                             Creatinine                0.68 mg/dL                             eGFR-AA                   >60 mL/min/1.73 m2  NA                             eGFR-WAGNER                  >60  mL/min/1.73 m2  NA                             Bili Total                0.3 mg/dL                             Bili Direct               0.10 mg/dL                             Bili Indirect             0.20 mg/dL                             AST                       14 unit/L  LOW                             ALT                       27 unit/L                             Alk Phos                  77 unit/L                             Total Protein             6.9 gm/dL                             Albumin Lvl               3.70 gm/dL                             Globulin                  3.20 gm/dL                             A/G Ratio                 1.2 ratio                             Hgb A1c                   5.6 %                             Vit D 25 OH               9.70 ng/mL  LOW                             Chol                      192 mg/dL                             HDL                       60 mg/dL                             Trig                      91 mg/dL                             LDL                       114 mg/dL                             Chol/HDL                  3.2                             VLDL                      18 mg/dL  NA                             TSH                       0.400 mIU/L                             WBC                       3.1 x10(3)/mcL  LOW                             RBC                       4.01 x10(6)/mcL  LOW                             Hgb                       11.6 gm/dL  LOW                             Hct                       36.2 %  LOW                             Platelet                  217 x10(3)/mcL                             MCV                       90.3 fL                             MCH                       28.9 pg                             MCHC                      32.0 gm/dL  LOW                             RDW                       12.5 %                             MPV                       10.5 fL                             Abs  Neut                  0.90 x10(3)/mcL  LOW                             Neutro Auto               29 %  NA                             Lymph Auto                62 %  NA                             Mono Auto                 6 %  NA                             Eos Auto                  3 %  NA                             Abs Eos                   0.1 x10(3)/mcL                             Basophil Auto             0 %  NA                             Abs Neutro                0.90 x10(3)/mcL  LOW                             Abs Lymph                 2.0 x10(3)/mcL                             Abs Mono                  0.2 x10(3)/mcL                             Abs Baso                  0.0 x10(3)/mcL  .

## 2022-05-05 ENCOUNTER — TELEPHONE (OUTPATIENT)
Dept: INTERNAL MEDICINE | Facility: CLINIC | Age: 62
End: 2022-05-05
Payer: COMMERCIAL

## 2022-05-05 NOTE — TELEPHONE ENCOUNTER
----- Message from Eliza Eddy sent at 5/5/2022 11:11 AM CDT -----  Regarding: med  Pt was given med for sinus issues, its a tad better but still coughing, dripping in throat, and is on her chest.  Can you call her should she try something else, please call [phone] before 5 (after 5pm  [phone])  [facility]    Per Dr. Hill ok to send out Tessalon Pearls but spoke to patient she doesn't need a refill, advised to get sinus pe otc

## 2022-05-19 LAB — BCS RECOMMENDATION EXT: NORMAL

## 2022-06-20 ENCOUNTER — TELEPHONE (OUTPATIENT)
Dept: INTERNAL MEDICINE | Facility: CLINIC | Age: 62
End: 2022-06-20
Payer: COMMERCIAL

## 2022-06-20 DIAGNOSIS — U07.1 COVID: Primary | ICD-10-CM

## 2022-06-20 RX ORDER — DOXYCYCLINE 100 MG/1
100 CAPSULE ORAL 2 TIMES DAILY
Qty: 14 CAPSULE | Refills: 0 | Status: SHIPPED | OUTPATIENT
Start: 2022-06-20 | End: 2022-06-27

## 2022-06-20 RX ORDER — PREDNISONE 10 MG/1
10 TABLET ORAL 2 TIMES DAILY
Qty: 14 TABLET | Refills: 0 | Status: SHIPPED | OUTPATIENT
Start: 2022-06-20 | End: 2022-06-27

## 2022-06-20 NOTE — TELEPHONE ENCOUNTER
Patient aware Prednisone, Doxycycline, Cheratussin    ----- Message from Denise Colón sent at 6/20/2022 10:35 AM CDT -----  Has covid since Saturday. On Sinus PE and cough medication prescription Benzonatate 100mg  Last night was very stuffy, feels mucus in throat and chest    Uses [facility] on [address]    [phone], please let her know

## 2022-06-27 ENCOUNTER — PATIENT OUTREACH (OUTPATIENT)
Dept: ADMINISTRATIVE | Facility: HOSPITAL | Age: 62
End: 2022-06-27
Payer: COMMERCIAL

## 2022-06-27 NOTE — PROGRESS NOTES
Population Health. Out Reach.  The following record(s)  below were uploaded for Health Maintenance .    2/23/18 PAP SMEAR  9/15/17 COLONOSCOPY

## 2022-08-03 RX ORDER — LOVASTATIN 40 MG/1
TABLET ORAL
Qty: 90 TABLET | Refills: 4 | Status: SHIPPED | OUTPATIENT
Start: 2022-08-03

## 2022-08-22 ENCOUNTER — TELEPHONE (OUTPATIENT)
Dept: INTERNAL MEDICINE | Facility: CLINIC | Age: 62
End: 2022-08-22
Payer: COMMERCIAL

## 2022-08-22 DIAGNOSIS — I10 HYPERTENSION, UNSPECIFIED TYPE: ICD-10-CM

## 2022-08-22 DIAGNOSIS — Z00.00 WELL ADULT EXAM: Primary | ICD-10-CM

## 2022-08-22 DIAGNOSIS — E55.9 VITAMIN D DEFICIENCY: ICD-10-CM

## 2022-08-22 DIAGNOSIS — E03.9 HYPOTHYROIDISM, UNSPECIFIED TYPE: ICD-10-CM

## 2022-08-22 NOTE — TELEPHONE ENCOUNTER
done    ----- Message from Denise Colón sent at 8/22/2022 10:53 AM CDT -----  Wellness set for 09/08/22  Can you out in her lab orders  thanks

## 2022-10-06 ENCOUNTER — LAB VISIT (OUTPATIENT)
Dept: LAB | Facility: HOSPITAL | Age: 62
End: 2022-10-06
Attending: INTERNAL MEDICINE
Payer: COMMERCIAL

## 2022-10-06 DIAGNOSIS — Z00.00 WELL ADULT EXAM: ICD-10-CM

## 2022-10-06 DIAGNOSIS — I10 HYPERTENSION, UNSPECIFIED TYPE: ICD-10-CM

## 2022-10-06 DIAGNOSIS — E03.9 HYPOTHYROIDISM, UNSPECIFIED TYPE: ICD-10-CM

## 2022-10-06 DIAGNOSIS — E55.9 VITAMIN D DEFICIENCY: ICD-10-CM

## 2022-10-06 LAB
ABS NEUT (OLG): 1.1 X10(3)/MCL (ref 2.1–9.2)
ALBUMIN SERPL-MCNC: 4.1 GM/DL (ref 3.4–4.8)
ALBUMIN/GLOB SERPL: 1.4 RATIO (ref 1.1–2)
ALP SERPL-CCNC: 69 UNIT/L (ref 40–150)
ALT SERPL-CCNC: 27 UNIT/L (ref 0–55)
APPEARANCE UR: CLEAR
AST SERPL-CCNC: 20 UNIT/L (ref 5–34)
BACTERIA #/AREA URNS AUTO: NORMAL /HPF
BILIRUB UR QL STRIP.AUTO: NEGATIVE MG/DL
BILIRUBIN DIRECT+TOT PNL SERPL-MCNC: 0.6 MG/DL
BUN SERPL-MCNC: 10.5 MG/DL (ref 9.8–20.1)
CALCIUM SERPL-MCNC: 10.1 MG/DL (ref 8.4–10.2)
CHLORIDE SERPL-SCNC: 103 MMOL/L (ref 98–107)
CHOLEST SERPL-MCNC: 218 MG/DL
CHOLEST/HDLC SERPL: 4 {RATIO} (ref 0–5)
CO2 SERPL-SCNC: 30 MMOL/L (ref 23–31)
COLOR UR AUTO: YELLOW
CREAT SERPL-MCNC: 0.76 MG/DL (ref 0.55–1.02)
CREAT UR-MCNC: 115 MG/DL (ref 47–110)
DEPRECATED CALCIDIOL+CALCIFEROL SERPL-MC: 17.4 NG/ML (ref 30–80)
EOSINOPHIL NFR BLD MANUAL: 0.12 X10(3)/MCL (ref 0–0.9)
EOSINOPHIL NFR BLD MANUAL: 4 %
ERYTHROCYTE [DISTWIDTH] IN BLOOD BY AUTOMATED COUNT: 12.8 % (ref 11.5–17)
EST. AVERAGE GLUCOSE BLD GHB EST-MCNC: 111.2 MG/DL
GFR SERPLBLD CREATININE-BSD FMLA CKD-EPI: >60 MLS/MIN/1.73/M2
GLOBULIN SER-MCNC: 2.9 GM/DL (ref 2.4–3.5)
GLUCOSE SERPL-MCNC: 89 MG/DL (ref 82–115)
GLUCOSE UR QL STRIP.AUTO: NEGATIVE MG/DL
HBA1C MFR BLD: 5.5 %
HCT VFR BLD AUTO: 38.8 % (ref 37–47)
HDLC SERPL-MCNC: 56 MG/DL (ref 35–60)
HGB BLD-MCNC: 12.5 GM/DL (ref 12–16)
IMM GRANULOCYTES # BLD AUTO: 0.02 X10(3)/MCL (ref 0–0.04)
IMM GRANULOCYTES NFR BLD AUTO: 0.7 %
INSTRUMENT WBC (OLG): 2.9 X10(3)/MCL
KETONES UR QL STRIP.AUTO: NEGATIVE MG/DL
LDLC SERPL CALC-MCNC: 140 MG/DL (ref 50–140)
LEUKOCYTE ESTERASE UR QL STRIP.AUTO: ABNORMAL UNIT/L
LYMPHOCYTES NFR BLD MANUAL: 1.51 X10(3)/MCL
LYMPHOCYTES NFR BLD MANUAL: 52 %
MCH RBC QN AUTO: 29.7 PG (ref 27–31)
MCHC RBC AUTO-ENTMCNC: 32.2 MG/DL (ref 33–36)
MCV RBC AUTO: 92.2 FL (ref 80–94)
MICROALBUMIN UR-MCNC: 5.6 UG/ML
MICROALBUMIN/CREAT RATIO PNL UR: 4.9 MG/GM CR (ref 0–30)
MONOCYTES NFR BLD MANUAL: 0.17 X10(3)/MCL (ref 0.1–1.3)
MONOCYTES NFR BLD MANUAL: 6 %
NEUTROPHILS NFR BLD MANUAL: 38 %
NITRITE UR QL STRIP.AUTO: NEGATIVE
NRBC BLD AUTO-RTO: 0 %
PH UR STRIP.AUTO: 6.5 [PH]
PLATELET # BLD AUTO: 226 X10(3)/MCL (ref 130–400)
PLATELET # BLD EST: NORMAL 10*3/UL
PMV BLD AUTO: 10.8 FL (ref 7.4–10.4)
POTASSIUM SERPL-SCNC: 4.2 MMOL/L (ref 3.5–5.1)
PROT SERPL-MCNC: 7 GM/DL (ref 5.8–7.6)
PROT UR QL STRIP.AUTO: NEGATIVE MG/DL
RBC # BLD AUTO: 4.21 X10(6)/MCL (ref 4.2–5.4)
RBC #/AREA URNS AUTO: <5 /HPF
RBC MORPH BLD: NORMAL
RBC UR QL AUTO: NEGATIVE UNIT/L
SODIUM SERPL-SCNC: 138 MMOL/L (ref 136–145)
SP GR UR STRIP.AUTO: 1.02 (ref 1–1.03)
SQUAMOUS #/AREA URNS AUTO: <5 /HPF
TRIGL SERPL-MCNC: 110 MG/DL (ref 37–140)
TSH SERPL-ACNC: 2.24 UIU/ML (ref 0.35–4.94)
UROBILINOGEN UR STRIP-ACNC: 0.2 MG/DL
VLDLC SERPL CALC-MCNC: 22 MG/DL
WBC # SPEC AUTO: 2.9 X10(3)/MCL (ref 4.5–11.5)
WBC #/AREA URNS AUTO: <5 /HPF

## 2022-10-06 PROCEDURE — 85025 COMPLETE CBC W/AUTO DIFF WBC: CPT

## 2022-10-06 PROCEDURE — 82043 UR ALBUMIN QUANTITATIVE: CPT

## 2022-10-06 PROCEDURE — 80053 COMPREHEN METABOLIC PANEL: CPT

## 2022-10-06 PROCEDURE — 80061 LIPID PANEL: CPT

## 2022-10-06 PROCEDURE — 85027 COMPLETE CBC AUTOMATED: CPT

## 2022-10-06 PROCEDURE — 81001 URINALYSIS AUTO W/SCOPE: CPT

## 2022-10-06 PROCEDURE — 84443 ASSAY THYROID STIM HORMONE: CPT

## 2022-10-06 PROCEDURE — 82306 VITAMIN D 25 HYDROXY: CPT

## 2022-10-06 PROCEDURE — 36415 COLL VENOUS BLD VENIPUNCTURE: CPT

## 2022-10-06 PROCEDURE — 83036 HEMOGLOBIN GLYCOSYLATED A1C: CPT

## 2022-10-13 ENCOUNTER — OFFICE VISIT (OUTPATIENT)
Dept: INTERNAL MEDICINE | Facility: CLINIC | Age: 62
End: 2022-10-13
Payer: COMMERCIAL

## 2022-10-13 VITALS
DIASTOLIC BLOOD PRESSURE: 80 MMHG | HEIGHT: 59 IN | BODY MASS INDEX: 33.87 KG/M2 | OXYGEN SATURATION: 96 % | WEIGHT: 168 LBS | SYSTOLIC BLOOD PRESSURE: 120 MMHG | HEART RATE: 80 BPM

## 2022-10-13 DIAGNOSIS — Z00.00 WELL ADULT EXAM: Primary | ICD-10-CM

## 2022-10-13 DIAGNOSIS — E03.9 HYPOTHYROIDISM, UNSPECIFIED TYPE: ICD-10-CM

## 2022-10-13 DIAGNOSIS — E04.2 NON-TOXIC MULTINODULAR GOITER: ICD-10-CM

## 2022-10-13 DIAGNOSIS — I10 PRIMARY HYPERTENSION: ICD-10-CM

## 2022-10-13 DIAGNOSIS — E55.9 VITAMIN D DEFICIENCY: ICD-10-CM

## 2022-10-13 DIAGNOSIS — Z12.11 ENCOUNTER FOR SCREENING COLONOSCOPY: ICD-10-CM

## 2022-10-13 DIAGNOSIS — E78.5 HYPERLIPIDEMIA, UNSPECIFIED HYPERLIPIDEMIA TYPE: ICD-10-CM

## 2022-10-13 DIAGNOSIS — Z23 NEED FOR INFLUENZA VACCINATION: ICD-10-CM

## 2022-10-13 PROBLEM — N60.19 FIBROCYSTIC BREAST CHANGES: Status: ACTIVE | Noted: 2022-10-13

## 2022-10-13 PROBLEM — K58.9 IRRITABLE BOWEL SYNDROME: Status: ACTIVE | Noted: 2022-10-13

## 2022-10-13 PROCEDURE — 3066F NEPHROPATHY DOC TX: CPT | Mod: CPTII,,, | Performed by: INTERNAL MEDICINE

## 2022-10-13 PROCEDURE — 3066F PR DOCUMENTATION OF TREATMENT FOR NEPHROPATHY: ICD-10-PCS | Mod: CPTII,,, | Performed by: INTERNAL MEDICINE

## 2022-10-13 PROCEDURE — 90471 FLU VACCINE (QUAD) GREATER THAN OR EQUAL TO 3YO PRESERVATIVE FREE IM: ICD-10-PCS | Mod: ,,, | Performed by: INTERNAL MEDICINE

## 2022-10-13 PROCEDURE — 3074F PR MOST RECENT SYSTOLIC BLOOD PRESSURE < 130 MM HG: ICD-10-PCS | Mod: CPTII,,, | Performed by: INTERNAL MEDICINE

## 2022-10-13 PROCEDURE — 3061F NEG MICROALBUMINURIA REV: CPT | Mod: CPTII,,, | Performed by: INTERNAL MEDICINE

## 2022-10-13 PROCEDURE — 90686 IIV4 VACC NO PRSV 0.5 ML IM: CPT | Mod: ,,, | Performed by: INTERNAL MEDICINE

## 2022-10-13 PROCEDURE — 1159F PR MEDICATION LIST DOCUMENTED IN MEDICAL RECORD: ICD-10-PCS | Mod: CPTII,,, | Performed by: INTERNAL MEDICINE

## 2022-10-13 PROCEDURE — 1160F PR REVIEW ALL MEDS BY PRESCRIBER/CLIN PHARMACIST DOCUMENTED: ICD-10-PCS | Mod: CPTII,,, | Performed by: INTERNAL MEDICINE

## 2022-10-13 PROCEDURE — 3074F SYST BP LT 130 MM HG: CPT | Mod: CPTII,,, | Performed by: INTERNAL MEDICINE

## 2022-10-13 PROCEDURE — 99396 PR PREVENTIVE VISIT,EST,40-64: ICD-10-PCS | Mod: 25,,, | Performed by: INTERNAL MEDICINE

## 2022-10-13 PROCEDURE — 1160F RVW MEDS BY RX/DR IN RCRD: CPT | Mod: CPTII,,, | Performed by: INTERNAL MEDICINE

## 2022-10-13 PROCEDURE — 90471 IMMUNIZATION ADMIN: CPT | Mod: ,,, | Performed by: INTERNAL MEDICINE

## 2022-10-13 PROCEDURE — 4010F PR ACE/ARB THEARPY RXD/TAKEN: ICD-10-PCS | Mod: CPTII,,, | Performed by: INTERNAL MEDICINE

## 2022-10-13 PROCEDURE — 3079F DIAST BP 80-89 MM HG: CPT | Mod: CPTII,,, | Performed by: INTERNAL MEDICINE

## 2022-10-13 PROCEDURE — 99396 PREV VISIT EST AGE 40-64: CPT | Mod: 25,,, | Performed by: INTERNAL MEDICINE

## 2022-10-13 PROCEDURE — 3061F PR NEG MICROALBUMINURIA RESULT DOCUMENTED/REVIEW: ICD-10-PCS | Mod: CPTII,,, | Performed by: INTERNAL MEDICINE

## 2022-10-13 PROCEDURE — 1159F MED LIST DOCD IN RCRD: CPT | Mod: CPTII,,, | Performed by: INTERNAL MEDICINE

## 2022-10-13 PROCEDURE — 90686 FLU VACCINE (QUAD) GREATER THAN OR EQUAL TO 3YO PRESERVATIVE FREE IM: ICD-10-PCS | Mod: ,,, | Performed by: INTERNAL MEDICINE

## 2022-10-13 PROCEDURE — 4010F ACE/ARB THERAPY RXD/TAKEN: CPT | Mod: CPTII,,, | Performed by: INTERNAL MEDICINE

## 2022-10-13 PROCEDURE — 3079F PR MOST RECENT DIASTOLIC BLOOD PRESSURE 80-89 MM HG: ICD-10-PCS | Mod: CPTII,,, | Performed by: INTERNAL MEDICINE

## 2022-10-13 NOTE — PROGRESS NOTES
Subjective:      Patient ID: Wanda Heredia is a 62 y.o. female.    Chief Complaint: Annual Exam      HPI:This patient is an established patient. Her active problem list and current medication listed in her chart will be reviewed at the time of this visit. This patient's medical illnesses have been well recognized and documented in her chart. A review of systems will be taken at the time of this visit and any new information necessary for her care will be documented. She has done well since her last visit to the office. She has been compliant in taking medication, and refilling her medication on a regular schedule. She had laboratory studies drawn prior to her office visit, and I took the liberty to review these results in detail with her. I have given her a hand written explanation of the results for her records.    This patient is a 62-year-old established patient is return for an annual examination.  He she has done well in the recent past, and she has no new symptoms or problems that have developed.  She will receive her flu shot today, and she has taken the COVID vaccinations.  The family have also done the same.  She has remained compliant in taking medication, and she has not had any side effects from their use.  Is patient leads an active lifestyle.     The patient's Health Maintenance was reviewed and the following appears to be due at this time:   Health Maintenance Due   Topic Date Due    Hepatitis C Screening  Never done    HIV Screening  Never done    TETANUS VACCINE  Never done    Shingles Vaccine (1 of 2) Never done    Cervical Cancer Screening  02/23/2021    COVID-19 Vaccine (4 - Booster for Pfizer series) 01/27/2022    Colorectal Cancer Screening  09/15/2022        Recent Labwork  Lab Visit on 10/06/2022   Component Date Value Ref Range Status    Cholesterol Total 10/06/2022 218 (H)  <=200 mg/dL Final    HDL Cholesterol 10/06/2022 56  35 - 60 mg/dL Final    Triglyceride 10/06/2022 110  37 - 140  mg/dL Final    Cholesterol/HDL Ratio 10/06/2022 4  0 - 5 Final    Very Low Density Lipoprotein 10/06/2022 22   Final    LDL Cholesterol 10/06/2022 140.00  50.00 - 140.00 mg/dL Final    Color, UA 10/06/2022 Yellow  Yellow, Colorless, Other, Clear Final    Appearance, UA 10/06/2022 Clear  Clear Final    Specific Gravity, UA 10/06/2022 1.018  1.001 - 1.030 Final    pH, UA 10/06/2022 6.5  5.0, 5.5, 6.0, 6.5, 7.0, 7.5, 8.0, 8.5 Final    Protein, UA 10/06/2022 Negative  Negative mg/dL Final    Glucose, UA 10/06/2022 Negative  Negative, Normal mg/dL Final    Ketones, UA 10/06/2022 Negative  Negative mg/dL Final    Blood, UA 10/06/2022 Negative  Negative unit/L Final    Bilirubin, UA 10/06/2022 Negative  Negative mg/dL Final    Urobilinogen, UA 10/06/2022 0.2  0.2, 1.0, Normal mg/dL Final    Nitrites, UA 10/06/2022 Negative  Negative Final    Leukocyte Esterase, UA 10/06/2022 Trace (A)  Negative unit/L Final    Sodium Level 10/06/2022 138  136 - 145 mmol/L Final    Potassium Level 10/06/2022 4.2  3.5 - 5.1 mmol/L Final    Chloride 10/06/2022 103  98 - 107 mmol/L Final    Carbon Dioxide 10/06/2022 30  23 - 31 mmol/L Final    Glucose Level 10/06/2022 89  82 - 115 mg/dL Final    Blood Urea Nitrogen 10/06/2022 10.5  9.8 - 20.1 mg/dL Final    Creatinine 10/06/2022 0.76  0.55 - 1.02 mg/dL Final    Calcium Level Total 10/06/2022 10.1  8.4 - 10.2 mg/dL Final    Protein Total 10/06/2022 7.0  5.8 - 7.6 gm/dL Final    Albumin Level 10/06/2022 4.1  3.4 - 4.8 gm/dL Final    Globulin 10/06/2022 2.9  2.4 - 3.5 gm/dL Final    Albumin/Globulin Ratio 10/06/2022 1.4  1.1 - 2.0 ratio Final    Bilirubin Total 10/06/2022 0.6  <=1.5 mg/dL Final    Alkaline Phosphatase 10/06/2022 69  40 - 150 unit/L Final    Alanine Aminotransferase 10/06/2022 27  0 - 55 unit/L Final    Aspartate Aminotransferase 10/06/2022 20  5 - 34 unit/L Final    eGFR 10/06/2022 >60  mls/min/1.73/m2 Final    Hemoglobin A1c 10/06/2022 5.5  <=7.0 % Final    Estimated Average  Glucose 10/06/2022 111.2  mg/dL Final    Urine Microalbumin 10/06/2022 5.6  <=30.0 ug/ml Final    Urine Creatinine 10/06/2022 115.0 (H)  47.0 - 110.0 mg/dL Final    Microalbumin Creatinine Ratio 10/06/2022 4.9  0.0 - 30.0 mg/gm Cr Final    Thyroid Stimulating Hormone 10/06/2022 2.2355  0.3500 - 4.9400 uIU/mL Final    Vit D 25 OH 10/06/2022 17.4 (L)  30.0 - 80.0 ng/mL Final    WBC 10/06/2022 2.9 (L)  4.5 - 11.5 x10(3)/mcL Final    RBC 10/06/2022 4.21  4.20 - 5.40 x10(6)/mcL Final    Hgb 10/06/2022 12.5  12.0 - 16.0 gm/dL Final    Hct 10/06/2022 38.8  37.0 - 47.0 % Final    MCV 10/06/2022 92.2  80.0 - 94.0 fL Final    MCH 10/06/2022 29.7  27.0 - 31.0 pg Final    MCHC 10/06/2022 32.2 (L)  33.0 - 36.0 mg/dL Final    RDW 10/06/2022 12.8  11.5 - 17.0 % Final    Platelet 10/06/2022 226  130 - 400 x10(3)/mcL Final    MPV 10/06/2022 10.8 (H)  7.4 - 10.4 fL Final    IG# 10/06/2022 0.02  0 - 0.04 x10(3)/mcL Final    IG% 10/06/2022 0.7  % Final    NRBC% 10/06/2022 0.0  % Final    Neut Man 10/06/2022 38  % Final    Lymph Man 10/06/2022 52  % Final    Monocyte Man 10/06/2022 6  % Final    Eos Man 10/06/2022 4  % Final    Instr WBC 10/06/2022 2.9  x10(3)/mcL Final    Abs Mono 10/06/2022 0.174  0.1 - 1.3 x10(3)/mcL Final    Abs Eos  10/06/2022 0.116  0 - 0.9 x10(3)/mcL Final    Abs Lymp 10/06/2022 1.508  0.6 - 4.6 x10(3)/mcL Final    Abs Neut 10/06/2022 1.102 (L)  2.1 - 9.2 x10(3)/mcL Final    RBC Morph 10/06/2022 Normal  Normal Final    Platelet Est 10/06/2022 Normal  Normal, Adequate Final    RBC, UA 10/06/2022 <5  <=5 /HPF Final    WBC, UA 10/06/2022 <5  <=5 /HPF Final    Squamous Epithelial Cells, UA 10/06/2022 <5  <=5 /HPF Final    Bacteria, UA 10/06/2022 None Seen  None Seen, Rare, Occasional /HPF Final       Past Medical History:  Past Medical History:   Diagnosis Date    GERD (gastroesophageal reflux disease)     Hyperlipidemia     Hypertension     Hypothyroidism     Personal history of colonic polyps 09/15/2017     Past  "Surgical History:   Procedure Laterality Date    COLONOSCOPY  09/15/2017     Review of patient's allergies indicates:   Allergen Reactions    Sulfa (sulfonamide antibiotics)      Current Outpatient Medications on File Prior to Visit   Medication Sig Dispense Refill    aspirin (ECOTRIN) 81 MG EC tablet Take 81 mg by mouth.      chlordiazepoxide-clidinium 5-2.5 mg (LIBRAX) 5-2.5 mg Cap Take 1 capsule by mouth 3 (three) times daily.      lisinopriL 10 MG tablet Take 10 mg by mouth once daily.      lovastatin (MEVACOR) 40 MG tablet TAKE 1 TABLET BY MOUTH EVERY DAY 90 tablet 4    pantoprazole (PROTONIX) 40 MG tablet Take 40 mg by mouth.      SYNTHROID 75 mcg tablet Take 75 mcg by mouth every morning.       No current facility-administered medications on file prior to visit.     Social History     Socioeconomic History    Marital status:    Tobacco Use    Smoking status: Never    Smokeless tobacco: Never   Substance and Sexual Activity    Alcohol use: Yes    Drug use: Never    Sexual activity: Yes     History reviewed. No pertinent family history.    Review of Systems  Review of Systems   Constitutional: Negative.    HENT: Negative.    Eyes: Negative.    Respiratory: Negative.    Cardiovascular: Negative.    Gastrointestinal: Negative.    Genitourinary: Negative.    Musculoskeletal: Negative.    Neurological: Negative.    Endo/Heme/Allergies: Negative.    Psychiatric/Behavioral: Negative.    Objective:   /80   Pulse 80   Ht 4' 11" (1.499 m)   Wt 76.2 kg (168 lb)   SpO2 96%   BMI 33.93 kg/m²         Physical Exam  Vitals and nursing note reviewed.   Constitutional:       General: He is not in acute distress.     Appearance: Normal appearance.   HENT:      Head: Normocephalic and atraumatic.      Right Ear: Tympanic membrane and ear canal normal.      Left Ear: Tympanic membrane and ear canal normal.      Nose: Nose normal.      Mouth/Throat:      Pharynx: Oropharynx is clear. No oropharyngeal exudate or " posterior oropharyngeal erythema.   Eyes:      Extraocular Movements: Extraocular movements intact.      Pupils: Pupils are equal, round, and reactive to light.   Cardiovascular:      Rate and Rhythm: Normal rate and regular rhythm.      Pulses: Normal pulses.      Heart sounds:  A grade 1/6 systolic ejection heart murmur can be heard in the 3rd intercostal space just lateral to the sternum.    No friction rub. No gallop.   Pulmonary:      Effort: Pulmonary effort is normal. No respiratory distress.      Breath sounds: Normal breath sounds.   Abdominal:      General: Abdomen is flat. Bowel sounds are normal.      Palpations: Abdomen is soft.   Genitourinary:     Rectum: Normal.   Musculoskeletal:         General: Normal range of motion.      Cervical back: Normal range of motion and neck supple.   Skin:     General: Skin is warm.      Capillary Refill: Capillary refill takes less than 2 seconds.   Neurological:      General: No focal deficit present.      Mental Status: He is alert and oriented to person, place, and time.   Psychiatric:         Mood and Affect: Mood normal.         Behavior: Behavior normal.         Thought Content: Thought content normal.         Judgment: Judgment normal.   Assessment:     1. Well adult exam    2. Hyperlipidemia, unspecified hyperlipidemia type    3. Non-toxic multinodular goiter    4. Primary hypertension    5. Vitamin D deficiency    6. Hypothyroidism, unspecified type    7. Need for influenza vaccination    8. Encounter for screening colonoscopy      Plan:   I am having Wanda MANZANARES Giovanna maintain her aspirin, chlordiazepoxide-clidinium 5-2.5 mg, SYNTHROID, lisinopriL, pantoprazole, and lovastatin.This patient is an established patient who has come in for an examination. She has done very well since her last visit to the office. She has a negative review of systems, except as mentioned above. She has not had any new problems to develop in the recent past. I was able to review her  laboratory studies with her completely, as mentioned in the history of present illness. I will make medication changes as necessary, and her follow-up will continue as before.  I have reviewed the patient's laboratory studies with her in detail, and I have given her a handwritten explanation of the results for records.  I have also talked about the low grade cardiac murmur that could be heard over the aortic valve, which is of no significance at this point.  She is not symptomatic of any cardiovascular related problem.  No treatment or intervention would be advised.    This patient will remain on medication as previously given, and I encouraged her to remain with an active lifestyle.  No restrictions in her activity will be necessary.    I discussed blood pressure management strategies with the patient today. I also recommend a low salt and low pork diet. We discussed antihypertensive medication. I have stressed an increase in physical activity and exercise.    Exercise is defined as 30 minutes of sustained activity performed at least 3 or 4 days each week.    I held a discussion about cholesterol management with the patient today. The patient understands better than before and will try to change the medication regimen and diet.  Medication taken to lower cholesterol are best taken at bedtime.    30 minutes were needed to perform an H and P, and to review this patient's test that were taken. It also required an additional 10 minutes to complete this electronic health record, which totaled 40 minutes of time and spent with the patient.    A flu shot was given in the left arm today, without any side effects.    Problem List Items Addressed This Visit          Cardiac/Vascular    Hyperlipidemia    Primary hypertension       Endocrine    Non-toxic multinodular goiter    Vitamin D deficiency     Other Visit Diagnoses       Well adult exam    -  Primary    Hypothyroidism, unspecified type        Need for influenza  vaccination        Relevant Orders    Influenza - Quadrivalent (PF) (Completed)    Encounter for screening colonoscopy        Relevant Orders    Ambulatory referral/consult to Gastroenterology            Wanda was seen today for annual exam.    Diagnoses and all orders for this visit:    Well adult exam    Hyperlipidemia, unspecified hyperlipidemia type    Non-toxic multinodular goiter    Primary hypertension    Vitamin D deficiency    Hypothyroidism, unspecified type    Need for influenza vaccination  -     Influenza - Quadrivalent (PF)    Encounter for screening colonoscopy  -     Ambulatory referral/consult to Gastroenterology; Future

## 2022-10-24 ENCOUNTER — DOCUMENTATION ONLY (OUTPATIENT)
Dept: INTERNAL MEDICINE | Facility: CLINIC | Age: 62
End: 2022-10-24
Payer: COMMERCIAL

## 2022-11-21 ENCOUNTER — TELEPHONE (OUTPATIENT)
Dept: INTERNAL MEDICINE | Facility: CLINIC | Age: 62
End: 2022-11-21
Payer: COMMERCIAL

## 2022-11-21 DIAGNOSIS — R01.1 HEART MURMUR: Primary | ICD-10-CM

## 2022-11-21 NOTE — TELEPHONE ENCOUNTER
----- Message from Denise Colón sent at 11/21/2022  3:45 PM CST -----  On her last visit Dr Hill mentioned she had a heart murmur. She feels like she should get this checked with a cardiologist. Can we refer her to Dr Ortiz's office   Please advise

## 2023-01-10 ENCOUNTER — TELEPHONE (OUTPATIENT)
Dept: INTERNAL MEDICINE | Facility: CLINIC | Age: 63
End: 2023-01-10
Payer: COMMERCIAL

## 2023-01-10 DIAGNOSIS — J30.9 NASAL SINUS INFLAMMATION DUE TO ALLERGY: Primary | ICD-10-CM

## 2023-01-10 RX ORDER — PREDNISONE 10 MG/1
10 TABLET ORAL DAILY
Qty: 7 TABLET | Refills: 0 | Status: SHIPPED | OUTPATIENT
Start: 2023-01-10 | End: 2023-01-17

## 2023-01-10 NOTE — TELEPHONE ENCOUNTER
Per Dr. Hill we can send in Prednisone 10mg daily for 7 days, she can also take zyrtec otc and afrin nasal spray.  Pt advised    ----- Message from Gudelia Otoole, Patient Care Assistant sent at 1/10/2023  2:59 PM CST -----  Regarding: med advice  Pt has been taking OTC meds and she is getting better but is having a lot of drainage and having to blow her nose with a cough. Can you call her back to advise her on what else she can do or take to finish knocking this out.    Phone #: [phone] or cell [phone]

## 2023-02-20 ENCOUNTER — TELEPHONE (OUTPATIENT)
Dept: INTERNAL MEDICINE | Facility: CLINIC | Age: 63
End: 2023-02-20
Payer: COMMERCIAL

## 2023-02-20 DIAGNOSIS — J01.80 OTHER ACUTE SINUSITIS, RECURRENCE NOT SPECIFIED: Primary | ICD-10-CM

## 2023-02-20 RX ORDER — PREDNISONE 10 MG/1
10 TABLET ORAL DAILY
Qty: 7 TABLET | Refills: 0 | Status: SHIPPED | OUTPATIENT
Start: 2023-02-20 | End: 2023-02-27

## 2023-02-27 ENCOUNTER — TELEPHONE (OUTPATIENT)
Dept: ADMINISTRATIVE | Facility: HOSPITAL | Age: 63
End: 2023-02-27
Payer: COMMERCIAL

## 2023-02-28 RX ORDER — CHLORDIAZEPOXIDE HYDROCHLORIDE AND CLIDINIUM BROMIDE 5; 2.5 MG/1; MG/1
1 CAPSULE ORAL 2 TIMES DAILY
Qty: 60 CAPSULE | Refills: 1 | Status: SHIPPED | OUTPATIENT
Start: 2023-02-28

## 2023-02-28 RX ORDER — CHLORDIAZEPOXIDE HYDROCHLORIDE AND CLIDINIUM BROMIDE 5; 2.5 MG/1; MG/1
1 CAPSULE ORAL 3 TIMES DAILY
Qty: 90 CAPSULE | Refills: 2 | Status: SHIPPED | OUTPATIENT
Start: 2023-02-28 | End: 2023-02-28 | Stop reason: SDUPTHER

## 2023-03-30 ENCOUNTER — TELEPHONE (OUTPATIENT)
Dept: INTERNAL MEDICINE | Facility: CLINIC | Age: 63
End: 2023-03-30
Payer: COMMERCIAL

## 2023-03-30 DIAGNOSIS — K21.9 GASTROESOPHAGEAL REFLUX DISEASE WITHOUT ESOPHAGITIS: Primary | ICD-10-CM

## 2023-03-30 RX ORDER — PANTOPRAZOLE SODIUM 40 MG/1
40 TABLET, DELAYED RELEASE ORAL DAILY
Qty: 30 TABLET | Refills: 7 | Status: SHIPPED | OUTPATIENT
Start: 2023-03-30

## 2023-05-08 ENCOUNTER — LAB VISIT (OUTPATIENT)
Dept: LAB | Facility: HOSPITAL | Age: 63
End: 2023-05-08
Attending: INTERNAL MEDICINE
Payer: COMMERCIAL

## 2023-05-08 DIAGNOSIS — I10 ESSENTIAL HYPERTENSION, MALIGNANT: Primary | ICD-10-CM

## 2023-05-08 DIAGNOSIS — R06.02 SHORTNESS OF BREATH: ICD-10-CM

## 2023-05-08 DIAGNOSIS — R53.82 CHRONIC FATIGUE: ICD-10-CM

## 2023-05-08 DIAGNOSIS — R07.89 OTHER CHEST PAIN: ICD-10-CM

## 2023-05-08 DIAGNOSIS — Z13.6 SCREENING FOR ISCHEMIC HEART DISEASE: ICD-10-CM

## 2023-05-08 DIAGNOSIS — R09.89 ABNORMAL CHEST SOUNDS: ICD-10-CM

## 2023-05-08 LAB
(HCYS)2 SERPL-MCNC: 5.7 UMOL/L (ref 5.1–15.4)
ALBUMIN SERPL-MCNC: 3.9 G/DL (ref 3.4–4.8)
ALP SERPL-CCNC: 69 UNIT/L (ref 40–150)
ALT SERPL-CCNC: 29 UNIT/L (ref 0–55)
AST SERPL-CCNC: 20 UNIT/L (ref 5–34)
BILIRUBIN DIRECT+TOT PNL SERPL-MCNC: 0.1 MG/DL (ref 0–?)
BILIRUBIN DIRECT+TOT PNL SERPL-MCNC: 0.2 MG/DL (ref 0–0.8)
BILIRUBIN DIRECT+TOT PNL SERPL-MCNC: 0.3 MG/DL
CHOLEST SERPL-MCNC: 163 MG/DL
CHOLEST/HDLC SERPL: 3 {RATIO} (ref 0–5)
CRP SERPL-MCNC: 2.6 MG/L
DEPRECATED CALCIDIOL+CALCIFEROL SERPL-MC: 27 NG/ML (ref 30–80)
FIBRINOGEN PPP-MCNC: 358 MG/DL (ref 210–463)
HDLC SERPL-MCNC: 47 MG/DL (ref 35–60)
LDLC SERPL CALC-MCNC: 99 MG/DL (ref 50–140)
PATH REV: NORMAL
PROT SERPL-MCNC: 6.6 GM/DL (ref 5.8–7.6)
TRIGL SERPL-MCNC: 84 MG/DL (ref 37–140)
TSH SERPL-ACNC: 2.27 UIU/ML (ref 0.35–4.94)
VLDLC SERPL CALC-MCNC: 17 MG/DL

## 2023-05-08 PROCEDURE — 85384 FIBRINOGEN ACTIVITY: CPT

## 2023-05-08 PROCEDURE — 80061 LIPID PANEL: CPT

## 2023-05-08 PROCEDURE — 86140 C-REACTIVE PROTEIN: CPT

## 2023-05-08 PROCEDURE — 84443 ASSAY THYROID STIM HORMONE: CPT

## 2023-05-08 PROCEDURE — 83695 ASSAY OF LIPOPROTEIN(A): CPT | Mod: 90

## 2023-05-08 PROCEDURE — 80076 HEPATIC FUNCTION PANEL: CPT

## 2023-05-08 PROCEDURE — 82306 VITAMIN D 25 HYDROXY: CPT

## 2023-05-08 PROCEDURE — 36415 COLL VENOUS BLD VENIPUNCTURE: CPT

## 2023-05-08 PROCEDURE — 83090 ASSAY OF HOMOCYSTEINE: CPT

## 2023-05-09 LAB — LPA SERPL-SCNC: 70 NMOL/L

## 2023-06-01 ENCOUNTER — DOCUMENTATION ONLY (OUTPATIENT)
Dept: INTERNAL MEDICINE | Facility: CLINIC | Age: 63
End: 2023-06-01
Payer: COMMERCIAL

## 2023-06-01 LAB — CRC RECOMMENDATION EXT: NORMAL

## 2023-12-05 ENCOUNTER — APPOINTMENT (OUTPATIENT)
Dept: LAB | Facility: HOSPITAL | Age: 63
End: 2023-12-05
Attending: INTERNAL MEDICINE
Payer: COMMERCIAL

## 2023-12-05 DIAGNOSIS — E55.9 VITAMIN D DEFICIENCY DISEASE: Primary | ICD-10-CM

## 2023-12-05 LAB — DEPRECATED CALCIDIOL+CALCIFEROL SERPL-MC: 29.3 NG/ML (ref 30–80)

## 2023-12-05 PROCEDURE — 82306 VITAMIN D 25 HYDROXY: CPT

## 2023-12-05 PROCEDURE — 36415 COLL VENOUS BLD VENIPUNCTURE: CPT

## 2025-07-10 ENCOUNTER — HOSPITAL ENCOUNTER (OUTPATIENT)
Dept: RADIOLOGY | Facility: HOSPITAL | Age: 65
Discharge: HOME | End: 2025-07-10
Attending: NURSE PRACTITIONER
Payer: COMMERCIAL

## 2025-07-10 DIAGNOSIS — M54.41 ACUTE BACK PAIN WITH SCIATICA, RIGHT: ICD-10-CM

## 2025-07-10 DIAGNOSIS — R10.30 INGUINAL PAIN, UNSPECIFIED LATERALITY: ICD-10-CM

## 2025-07-10 PROCEDURE — 74018 RADEX ABDOMEN 1 VIEW: CPT | Mod: TC

## 2025-07-10 PROCEDURE — 72110 X-RAY EXAM L-2 SPINE 4/>VWS: CPT | Mod: TC

## 2025-09-04 DIAGNOSIS — R10.84 GENERALIZED ABDOMINAL PAIN: Primary | ICD-10-CM

## 2025-09-04 DIAGNOSIS — R10.31 ABDOMINAL PAIN, RIGHT LOWER QUADRANT: ICD-10-CM

## 2025-09-05 ENCOUNTER — HOSPITAL ENCOUNTER (OUTPATIENT)
Dept: RADIOLOGY | Facility: HOSPITAL | Age: 65
Discharge: HOME | End: 2025-09-05
Attending: NURSE PRACTITIONER
Payer: COMMERCIAL

## 2025-09-05 DIAGNOSIS — R10.84 ABDOMINAL PAIN, GENERALIZED: Primary | ICD-10-CM

## 2025-09-05 DIAGNOSIS — R10.31 ABDOMINAL PAIN, RIGHT LOWER QUADRANT: ICD-10-CM

## 2025-09-05 DIAGNOSIS — R10.84 GENERALIZED ABDOMINAL PAIN: ICD-10-CM

## 2025-09-05 DIAGNOSIS — R10.84 ABDOMINAL PAIN, GENERALIZED: ICD-10-CM

## 2025-09-05 PROCEDURE — 76856 US EXAM PELVIC COMPLETE: CPT | Mod: TC

## 2025-09-05 PROCEDURE — 76700 US EXAM ABDOM COMPLETE: CPT | Mod: TC
